# Patient Record
Sex: MALE | Race: WHITE | NOT HISPANIC OR LATINO | Employment: FULL TIME | URBAN - METROPOLITAN AREA
[De-identification: names, ages, dates, MRNs, and addresses within clinical notes are randomized per-mention and may not be internally consistent; named-entity substitution may affect disease eponyms.]

---

## 2018-05-08 ENCOUNTER — TELEPHONE (OUTPATIENT)
Dept: URGENT CARE | Facility: CLINIC | Age: 18
End: 2018-05-08

## 2018-05-08 ENCOUNTER — OFFICE VISIT (OUTPATIENT)
Dept: URGENT CARE | Facility: CLINIC | Age: 18
End: 2018-05-08
Payer: COMMERCIAL

## 2018-05-08 VITALS
HEIGHT: 72 IN | TEMPERATURE: 98 F | HEART RATE: 81 BPM | DIASTOLIC BLOOD PRESSURE: 79 MMHG | BODY MASS INDEX: 23.7 KG/M2 | RESPIRATION RATE: 16 BRPM | OXYGEN SATURATION: 98 % | WEIGHT: 175 LBS | SYSTOLIC BLOOD PRESSURE: 126 MMHG

## 2018-05-08 DIAGNOSIS — M79.5 FOREIGN BODY (FB) IN SOFT TISSUE: Primary | ICD-10-CM

## 2018-05-08 PROCEDURE — 90471 IMMUNIZATION ADMIN: CPT | Mod: S$GLB,,, | Performed by: FAMILY MEDICINE

## 2018-05-08 PROCEDURE — 99203 OFFICE O/P NEW LOW 30 MIN: CPT | Mod: 25,S$GLB,, | Performed by: FAMILY MEDICINE

## 2018-05-08 PROCEDURE — 3008F BODY MASS INDEX DOCD: CPT | Mod: CPTII,S$GLB,, | Performed by: FAMILY MEDICINE

## 2018-05-08 PROCEDURE — 90714 TD VACC NO PRESV 7 YRS+ IM: CPT | Mod: S$GLB,,, | Performed by: FAMILY MEDICINE

## 2018-05-08 RX ORDER — HYDROCODONE BITARTRATE AND ACETAMINOPHEN 5; 325 MG/1; MG/1
1 TABLET ORAL EVERY 12 HOURS PRN
Qty: 5 TABLET | Refills: 0 | Status: SHIPPED | OUTPATIENT
Start: 2018-05-08 | End: 2022-11-08

## 2018-05-08 RX ORDER — DOXYCYCLINE 100 MG/1
100 CAPSULE ORAL 2 TIMES DAILY
Qty: 20 CAPSULE | Refills: 0 | Status: SHIPPED | OUTPATIENT
Start: 2018-05-08 | End: 2018-05-08 | Stop reason: SDUPTHER

## 2018-05-08 RX ORDER — DOXYCYCLINE 100 MG/1
100 CAPSULE ORAL 2 TIMES DAILY
Qty: 20 CAPSULE | Refills: 0 | Status: SHIPPED | OUTPATIENT
Start: 2018-05-08 | End: 2018-05-18

## 2018-05-08 NOTE — PROCEDURES
Excision of Lesion  Date/Time: 5/8/2018 3:26 PM  Performed by: PERCY WU  Authorized by: PERCY WU     Consent Done?:  Yes (Verbal)  LOCATION:  Body area:  Upper arm / elbowright    PREP:Position:  Supine    ANESTHESIA:  Anesthesia:  Local infiltration  Local anesthetic:  Lidocaine 1% without epinephrine    PROCEDURE DETAILS:  Excision type:  Foreign body removal  Excision size (cm):  0.5  Scalpel size:  11  Incision type:  Elliptical  Specimens?: Yes    Hemostasis was obtained.   Removal of a 3.3cm palm that appeared intact. Pt tolerated with slight discomfort.

## 2018-05-08 NOTE — PROGRESS NOTES
Subjective:       Patient ID: Lisa Kat is a 17 y.o. male.    Vitals:  height is 6' (1.829 m) and weight is 79.4 kg (175 lb). His temperature is 98.3 °F (36.8 °C). His blood pressure is 126/79 and his pulse is 81. His respiration is 16 and oxygen saturation is 98%.     Chief Complaint: Foreign Body    Foreign Body   The incident occurred less than 1 hour ago. Pertinent negatives include no abdominal pain, chest pain or nosebleeds.     Review of Systems   Constitution: Negative for weakness and malaise/fatigue.   HENT: Negative for nosebleeds.    Cardiovascular: Negative for chest pain and syncope.   Respiratory: Negative for shortness of breath.    Musculoskeletal: Positive for joint pain. Negative for back pain and neck pain.   Gastrointestinal: Negative for abdominal pain.   Genitourinary: Negative for hematuria.   Neurological: Negative for dizziness and numbness.       Objective:      Physical Exam   Constitutional: He is oriented to person, place, and time. He appears well-developed and well-nourished. He is cooperative.  Non-toxic appearance. He does not appear ill. No distress.   HENT:   Head: Normocephalic and atraumatic. Head is without abrasion, without contusion and without laceration.   Right Ear: Hearing, tympanic membrane and ear canal normal. No hemotympanum.   Left Ear: Hearing, tympanic membrane and ear canal normal. No hemotympanum.   Nose: Nose normal. No mucosal edema, rhinorrhea or nasal deformity. No epistaxis. Right sinus exhibits no maxillary sinus tenderness and no frontal sinus tenderness. Left sinus exhibits no maxillary sinus tenderness and no frontal sinus tenderness.   Mouth/Throat: Uvula is midline and mucous membranes are normal. No trismus in the jaw. Normal dentition. No uvula swelling. No posterior oropharyngeal erythema.   Eyes: Conjunctivae, EOM and lids are normal. Pupils are equal, round, and reactive to light. Right eye exhibits no discharge. Left eye exhibits no  discharge. No scleral icterus.   Sclera clear bilat   Neck: Trachea normal, normal range of motion, full passive range of motion without pain and phonation normal. Neck supple. No spinous process tenderness and no muscular tenderness present. No neck rigidity. No tracheal deviation present.   Cardiovascular: Normal rate, intact distal pulses and normal pulses.    Pulmonary/Chest: Effort normal and breath sounds normal. No respiratory distress.   Abdominal: Normal appearance. He exhibits no distension, no pulsatile midline mass and no mass. There is no tenderness.   Musculoskeletal: He exhibits edema and tenderness. He exhibits no deformity.        Right elbow: He exhibits decreased range of motion and swelling (foreign body in lateral forearm. ).   Neurological: He is alert and oriented to person, place, and time. He has normal strength. No cranial nerve deficit or sensory deficit. He exhibits normal muscle tone. He displays no seizure activity. Coordination normal. GCS eye subscore is 4. GCS verbal subscore is 5. GCS motor subscore is 6.   Skin: Skin is warm, dry and intact. Capillary refill takes less than 2 seconds. No abrasion, no bruising, no burn, no ecchymosis and no laceration noted. He is not diaphoretic. No pallor.   Psychiatric: He has a normal mood and affect. His speech is normal and behavior is normal. Judgment and thought content normal. Cognition and memory are normal.   Nursing note and vitals reviewed.      Assessment:       1. Foreign body (FB) in soft tissue        Plan:         Foreign body (FB) in soft tissue  -     X-Ray Elbow Complete Right; Future; Expected date: 05/08/2018  -     Ambulatory referral to Orthopedics    Other orders  -     (In Office Administered) Td Vaccine - Preservative Free  -     Discontinue: doxycycline (VIBRAMYCIN) 100 MG Cap; Take 1 capsule (100 mg total) by mouth 2 (two) times daily.  Dispense: 20 capsule; Refill: 0  -     doxycycline (VIBRAMYCIN) 100 MG Cap; Take 1  capsule (100 mg total) by mouth 2 (two) times daily.  Dispense: 20 capsule; Refill: 0  -     hydrocodone-acetaminophen 5-325mg (NORCO) 5-325 mg per tablet; Take 1 tablet by mouth every 12 (twelve) hours as needed for Pain.  Dispense: 5 tablet; Refill: 0    post porcedure, pt full range of motion and pain relief. Bandaged. We were ablr to set up appt at [facility] and [facility] in , but Pt is going to f/u with an ortho in [facility] near his home. He understands the importance of f/u and risks if he does not. Given s/sx to be aware of for seeking higher level of care

## 2018-05-09 ENCOUNTER — TELEPHONE (OUTPATIENT)
Dept: URGENT CARE | Facility: CLINIC | Age: 18
End: 2018-05-09

## 2018-05-09 NOTE — TELEPHONE ENCOUNTER
Went to ER. Given abx shot and sent home. Has appt with ortho at 115. Still feeling the same. Appreciated call

## 2018-05-11 ENCOUNTER — TELEPHONE (OUTPATIENT)
Dept: URGENT CARE | Facility: CLINIC | Age: 18
End: 2018-05-11

## 2022-01-25 ENCOUNTER — CLINICAL SUPPORT (OUTPATIENT)
Dept: OTHER | Facility: CLINIC | Age: 22
End: 2022-01-25

## 2022-01-25 DIAGNOSIS — Z00.8 ENCOUNTER FOR OTHER GENERAL EXAMINATION: ICD-10-CM

## 2022-01-26 VITALS — BODY MASS INDEX: 23.73 KG/M2 | HEIGHT: 72 IN

## 2022-01-26 LAB
HDLC SERPL-MCNC: 62 MG/DL
POC CHOLESTEROL, LDL (DOCK): 118 MG/DL
POC CHOLESTEROL, TOTAL: 197 MG/DL
POC GLUCOSE, FASTING: 86 MG/DL (ref 60–110)
TRIGL SERPL-MCNC: 80 MG/DL

## 2022-11-01 DIAGNOSIS — Z00.00 ROUTINE GENERAL MEDICAL EXAMINATION AT A HEALTH CARE FACILITY: Primary | ICD-10-CM

## 2022-11-07 NOTE — PROGRESS NOTES
"Nutrition Assessment  Session Time:        Client name:  Lisa Kat  :  2000  Age:  22 y.o.  Gender:  male    Client states:  [employer] employee present for [employer] physical.   No prior  physical.  No prior nutrition consultations reported.    Recently began working with [email or web address]. 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  No questions or concerns today with nutrition or health status.  Reports no history of elevated blood pressure, abnormal lipid panel, or elevated HgbA1c.   No nutrition related goals needing to be discussed.       Anthropometrics  Height:  72"     Weight:  204 lbs  BMI:  27.7  % Body Fat:  n/a    Clinical Signs/Symptoms  N/V/D:  none noted  Appetite:  good       No past medical history on file.    No past surgical history on file.    Medications    has a current medication list which includes the following prescription(s): hydrocodone-acetaminophen.    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    Social History     Tobacco Use    Smoking status: Never    Smokeless tobacco: Never   Substance Use Topics    Alcohol use: Not on file        Lab Reports   No results found for: NA, K, CL, CO2, GLU, BUN, CREATININE, CALCIUM, PROT, ALBUMIN, BILITOT, ALKPHOS, AST, ALT, ANIONGAP, ESTGFRAFRICA, EGFRNONAA   No results found for: WBC, HGB, HCT, MCV, PLT     No results found for: CHOL  No results found for: HDL  No results found for: LDLCALC  No results found for: TRIG  No results found for: CHOLHDL  No results found for: LABA1C, HGBA1C  BP Readings from Last 1 Encounters:   18 126/79       Food History  Meals: 3  Snacks: 0  Vegetables: variety, almost daily  Fruits: variety, daily banana  Drinks: water, coffee (black)  Alcohol: occasionally, " 1 day per month   Dine out: very rare     Exercise History:  3-4 days weekly// cardio mostly, 2 hours     Cultural/Spiritual/Personal Preferences:  None identified    Support System:   none noted    State of Change:  Precontemplation    Barriers to Change:  none    Diagnosis    No nutrition diagnosis.    Intervention    RMR (Method:  [address] Jeor):  1956 kcal  Activity Factor:  1.3    HOLLY:  2542 kcal    Goals:  1.  Continue current routine.       Nutrition Education  The following education was provided to the patient:  Discussed Heart Healthy Eating.  *Lab results were pending at time of consult and so, not discussed with patient.  Encouraged patient to call if he has any nutrition concerns he would like to discuss.    Patient verbalized understanding of nutrition education and recommendations received.    Handouts Provided  none    Monitoring/Evaluation    Monitor the following:  Weight  BMI  Caloric intake  Labs:  lipid panel, A1c    Follow Up Plan:  Communication with referring healthcare provider is unnecessary at this time as patient presented as part of annual wellness exam.  However, will follow up with patient in 1-2 years.

## 2022-11-08 ENCOUNTER — CLINICAL SUPPORT (OUTPATIENT)
Dept: AUDIOLOGY | Facility: CLINIC | Age: 22
End: 2022-11-08
Payer: COMMERCIAL

## 2022-11-08 ENCOUNTER — HOSPITAL ENCOUNTER (OUTPATIENT)
Dept: CARDIOLOGY | Facility: HOSPITAL | Age: 22
Discharge: HOME | End: 2022-11-08
Attending: FAMILY MEDICINE
Payer: COMMERCIAL

## 2022-11-08 ENCOUNTER — HOSPITAL ENCOUNTER (OUTPATIENT)
Dept: RADIOLOGY | Facility: HOSPITAL | Age: 22
Discharge: HOME | End: 2022-11-08
Attending: FAMILY MEDICINE
Payer: COMMERCIAL

## 2022-11-08 ENCOUNTER — OFFICE VISIT (OUTPATIENT)
Dept: INTERNAL MEDICINE | Facility: CLINIC | Age: 22
End: 2022-11-08
Payer: COMMERCIAL

## 2022-11-08 ENCOUNTER — CLINICAL SUPPORT (OUTPATIENT)
Dept: INTERNAL MEDICINE | Facility: CLINIC | Age: 22
End: 2022-11-08
Payer: COMMERCIAL

## 2022-11-08 VITALS
WEIGHT: 202.81 LBS | BODY MASS INDEX: 27.47 KG/M2 | HEIGHT: 72 IN | TEMPERATURE: 98 F | SYSTOLIC BLOOD PRESSURE: 117 MMHG | HEART RATE: 45 BPM | DIASTOLIC BLOOD PRESSURE: 69 MMHG

## 2022-11-08 DIAGNOSIS — Z01.12 HEARING CONSERVATION AND TREATMENT EXAM: Primary | ICD-10-CM

## 2022-11-08 DIAGNOSIS — Z00.00 ROUTINE GENERAL MEDICAL EXAMINATION AT A HEALTH CARE FACILITY: ICD-10-CM

## 2022-11-08 DIAGNOSIS — Z00.00 ROUTINE GENERAL MEDICAL EXAMINATION AT A HEALTH CARE FACILITY: Primary | ICD-10-CM

## 2022-11-08 DIAGNOSIS — Z11.59 NEED FOR HEPATITIS C SCREENING TEST: ICD-10-CM

## 2022-11-08 DIAGNOSIS — Z71.3 DIETARY COUNSELING: Primary | ICD-10-CM

## 2022-11-08 DIAGNOSIS — Z11.4 SCREENING FOR HIV (HUMAN IMMUNODEFICIENCY VIRUS): ICD-10-CM

## 2022-11-08 LAB
ALBUMIN SERPL BCP-MCNC: 4.8 G/DL (ref 3.5–5.2)
ALP SERPL-CCNC: 62 U/L (ref 55–135)
ALT SERPL W/O P-5'-P-CCNC: 16 U/L (ref 10–44)
ANION GAP SERPL CALC-SCNC: 12 MMOL/L (ref 8–16)
AST SERPL-CCNC: 23 U/L (ref 10–40)
BILIRUB SERPL-MCNC: 1.2 MG/DL (ref 0.1–1)
BILIRUB UR QL STRIP: NEGATIVE
BUN SERPL-MCNC: 20 MG/DL (ref 6–20)
CALCIUM SERPL-MCNC: 9.9 MG/DL (ref 8.7–10.5)
CHLORIDE SERPL-SCNC: 103 MMOL/L (ref 95–110)
CHOLEST SERPL-MCNC: 228 MG/DL (ref 120–199)
CHOLEST/HDLC SERPL: 3.2 {RATIO} (ref 2–5)
CLARITY UR: CLEAR
CO2 SERPL-SCNC: 25 MMOL/L (ref 23–29)
COLOR UR: YELLOW
CREAT SERPL-MCNC: 1.5 MG/DL (ref 0.5–1.4)
ERYTHROCYTE [DISTWIDTH] IN BLOOD BY AUTOMATED COUNT: 12.1 % (ref 11.5–14.5)
EST. GFR  (NO RACE VARIABLE): >60 ML/MIN/1.73 M^2
ESTIMATED AVG GLUCOSE: 94 MG/DL (ref 68–131)
GLUCOSE SERPL-MCNC: 81 MG/DL (ref 70–110)
GLUCOSE UR QL STRIP: NEGATIVE
HBA1C MFR BLD: 4.9 % (ref 4–5.6)
HCT VFR BLD AUTO: 44.2 % (ref 40–54)
HCV AB SERPL QL IA: NORMAL
HDLC SERPL-MCNC: 72 MG/DL (ref 40–75)
HDLC SERPL: 31.6 % (ref 20–50)
HGB BLD-MCNC: 15.4 G/DL (ref 14–18)
HGB UR QL STRIP: NEGATIVE
HIV 1+2 AB+HIV1 P24 AG SERPL QL IA: NORMAL
KETONES UR QL STRIP: NEGATIVE
LDLC SERPL CALC-MCNC: 142.6 MG/DL (ref 63–159)
LEUKOCYTE ESTERASE UR QL STRIP: NEGATIVE
MCH RBC QN AUTO: 32.8 PG (ref 27–31)
MCHC RBC AUTO-ENTMCNC: 34.8 G/DL (ref 32–36)
MCV RBC AUTO: 94 FL (ref 82–98)
NITRITE UR QL STRIP: NEGATIVE
NONHDLC SERPL-MCNC: 156 MG/DL
PH UR STRIP: 6 [PH] (ref 5–8)
PLATELET # BLD AUTO: 223 K/UL (ref 150–450)
PMV BLD AUTO: 11.4 FL (ref 9.2–12.9)
POTASSIUM SERPL-SCNC: 4.4 MMOL/L (ref 3.5–5.1)
PROT SERPL-MCNC: 7.7 G/DL (ref 6–8.4)
PROT UR QL STRIP: NEGATIVE
RBC # BLD AUTO: 4.69 M/UL (ref 4.6–6.2)
SODIUM SERPL-SCNC: 140 MMOL/L (ref 136–145)
SP GR UR STRIP: 1.02 (ref 1–1.03)
TRIGL SERPL-MCNC: 67 MG/DL (ref 30–150)
URN SPEC COLLECT METH UR: NORMAL
WBC # BLD AUTO: 4.7 K/UL (ref 3.9–12.7)

## 2022-11-08 PROCEDURE — 99999 PR PBB SHADOW E&M-EST. PATIENT-LVL II: ICD-10-PCS | Mod: PBBFAC,,, | Performed by: AUDIOLOGIST

## 2022-11-08 PROCEDURE — 99999 PR PBB SHADOW E&M-EST. PATIENT-LVL III: ICD-10-PCS | Mod: PBBFAC,,, | Performed by: FAMILY MEDICINE

## 2022-11-08 PROCEDURE — 83655 ASSAY OF LEAD: CPT | Performed by: FAMILY MEDICINE

## 2022-11-08 PROCEDURE — 71046 X-RAY EXAM CHEST 2 VIEWS: CPT | Mod: TC

## 2022-11-08 PROCEDURE — 71046 XR CHEST PA AND LATERAL: ICD-10-PCS | Mod: 26,,, | Performed by: RADIOLOGY

## 2022-11-08 PROCEDURE — 93010 EKG 12-LEAD: ICD-10-PCS | Mod: ,,, | Performed by: INTERNAL MEDICINE

## 2022-11-08 PROCEDURE — 85027 COMPLETE CBC AUTOMATED: CPT | Performed by: FAMILY MEDICINE

## 2022-11-08 PROCEDURE — 71046 X-RAY EXAM CHEST 2 VIEWS: CPT | Mod: 26,,, | Performed by: RADIOLOGY

## 2022-11-08 PROCEDURE — 86803 HEPATITIS C AB TEST: CPT | Performed by: FAMILY MEDICINE

## 2022-11-08 PROCEDURE — 97802 PR MED NUTR THER, 1ST, INDIV, EA 15 MIN: ICD-10-PCS | Mod: S$GLB,,, | Performed by: INTERNAL MEDICINE

## 2022-11-08 PROCEDURE — 87389 HIV-1 AG W/HIV-1&-2 AB AG IA: CPT | Performed by: FAMILY MEDICINE

## 2022-11-08 PROCEDURE — 99385 PREV VISIT NEW AGE 18-39: CPT | Mod: S$PBB,,, | Performed by: FAMILY MEDICINE

## 2022-11-08 PROCEDURE — 92552 PURE TONE AUDIOMETRY AIR: CPT | Mod: PBBFAC | Performed by: AUDIOLOGIST

## 2022-11-08 PROCEDURE — 80061 LIPID PANEL: CPT | Performed by: FAMILY MEDICINE

## 2022-11-08 PROCEDURE — 99999 PR PBB SHADOW E&M-EST. PATIENT-LVL II: CPT | Mod: PBBFAC,,, | Performed by: AUDIOLOGIST

## 2022-11-08 PROCEDURE — 93005 ELECTROCARDIOGRAM TRACING: CPT

## 2022-11-08 PROCEDURE — 97802 MEDICAL NUTRITION INDIV IN: CPT | Mod: S$GLB,,, | Performed by: INTERNAL MEDICINE

## 2022-11-08 PROCEDURE — 93010 ELECTROCARDIOGRAM REPORT: CPT | Mod: ,,, | Performed by: INTERNAL MEDICINE

## 2022-11-08 PROCEDURE — 99213 OFFICE O/P EST LOW 20 MIN: CPT | Mod: PBBFAC,25,27 | Performed by: FAMILY MEDICINE

## 2022-11-08 PROCEDURE — 99385 PR PREVENTIVE VISIT,NEW,18-39: ICD-10-PCS | Mod: S$PBB,,, | Performed by: FAMILY MEDICINE

## 2022-11-08 PROCEDURE — 80053 COMPREHEN METABOLIC PANEL: CPT | Performed by: FAMILY MEDICINE

## 2022-11-08 PROCEDURE — 81003 URINALYSIS AUTO W/O SCOPE: CPT | Performed by: FAMILY MEDICINE

## 2022-11-08 PROCEDURE — 83036 HEMOGLOBIN GLYCOSYLATED A1C: CPT | Performed by: FAMILY MEDICINE

## 2022-11-08 PROCEDURE — 99212 OFFICE O/P EST SF 10 MIN: CPT | Mod: PBBFAC,25 | Performed by: AUDIOLOGIST

## 2022-11-08 PROCEDURE — 99999 PR PBB SHADOW E&M-EST. PATIENT-LVL III: CPT | Mod: PBBFAC,,, | Performed by: FAMILY MEDICINE

## 2022-11-08 NOTE — LETTER
November 8, 2022    Lisa Kat  [id number] [facility] [address]             [address]  [address]  [place] [address]  Phone: [phone]  Fax: [phone] Dear Mr. Kat,    On November 8, 2022, it was a pleasure to meet you and perform your initial Executive Health evaluation. At the time of this visit, you are 22 years old. You denied any specific complaints or concerns during todays visit.      You do not have ANY SIGNIFICANT PAST MEDICAL HISTORY.      YOUR PAST SURGICAL HISTORY includes a left scaphoid fracture repair, a tonsillectomy, and adenoidectomy.    You do not take ANY REGULAR MEDICATIONS.    You do not have ANY KNOWN DRUG ALLERGIES.    YOUR SOCIAL HISTORY includes employment by the [employer]. You are  with one child. You vape infrequently. You drink approximately three alcoholic beverages per week.  A You denied any illicit drug use.    YOUR FAMILY HISTORY includes your father, alive, with no known medical problems. Your mother, alive, with no known medical problems. No known colon or prostate cancer.    YOUR ROUTINE HEALTH MAINTENANCE includes a tetanus booster in September 2019, no recent flu vaccines, and no previous COVID vaccines.    PHYSICAL EXAMINATION: You are  6 feet tall, weighing 203 pounds with a body mass index of 27.5. This places you in the Overweight category. Your blood pressure is 117/69. Your heart rate is 45 beats per minute. All of these are acceptable values. Your physical examination did not reveal any significant abnormal findings.    YOUR BLOOD WORK AND ADDITIONAL TESTS: Reveal normal white cell counts, red cell counts, and platelet levels. You are not Anemic. Your glucose, kidney, liver, and electrolyte tests are normal. Your total cholesterol is 228. Your triglycerides are 67. Your HDL is 72. Your LDL is 143.  Based on these numbers, your 10-year risk of heart attack or stroke is <1%. Your  hemoglobin A1c is 4.9%, which is in a normal range. You do not have Prediabetes or Diabetes. Your urinalysis is normal.  Your serum lead level is <1.0, which is in a normal range. Your HIV test is negative. Your hepatitis C test is negative.      Your EKG shows a sinus bradycardia with sinus arrhythmia, but otherwise is normal.    Your CXR is normal with no acute or chronic cardiopulmonary abnormality present.      Your Audiology exam revealed decreased hearing bilaterally at 4000 hertz frequency, otherwise normal. Wearing hearing protective devices around loud noises is recommended..    In summary, you are overall in excellent health.  You are Overweight. Lifestyle modifications to promote weight loss, like regular physical activity and decreased caloric intake, are encouraged. Your 10-year risk of heart attack or stroke is low. Daily aspirin or cholesterol medications are not recommended.    Based on the [place] Preventive Task Force recommendations, you should receive yearly Influenza vaccinations. You should have a Tetanus booster every 10 years. You should complete the primary COVID vaccine series. You should start screening for Colon cancer at 45 years old.    It was a pleasure to meet you and perform this Executive Health evaluation. If I can be of any further assistance, or if you have any additional questions or concerns, please do not hesitate to let me know.    Sincerely,      Anthony Ricardo MD, FAAFP

## 2022-11-08 NOTE — PROGRESS NOTES
Subjective:   Patient ID: Lisa Kat is a 22 y.o. male.  Chief Complaint:  Executive Health    Executive Health evaluation 1.   No since past medical history  Surgical history for scaphoid fracture left wrist, tonsillectomy, adenoidectomy   No current medications  No drug allergies   Social history employed by [employer].  .  One child.  Vapes infrequently.  Averages 3 alcoholic beverages per week.  No illicit drug use.    Family history includes mother and father both alive and healthy.  No colon cancer or prostate cancer.    Health maintenance with tetanus booster September 2019, no recent flu vaccine, no previous COVID vaccines   No specific complaints or concerns today    Review of Systems   Constitutional:  Negative for chills, fatigue and fever.   HENT:  Negative for congestion, dental problem, ear discharge, ear pain, postnasal drip, rhinorrhea, sinus pressure, sinus pain, sore throat and trouble swallowing.    Eyes:  Negative for pain, redness and visual disturbance.   Respiratory:  Negative for cough, chest tightness, shortness of breath and wheezing.    Cardiovascular:  Negative for chest pain, palpitations and leg swelling.   Gastrointestinal:  Negative for abdominal pain, constipation, diarrhea, nausea and vomiting.   Endocrine: Negative for polydipsia, polyphagia and polyuria.   Genitourinary:  Negative for difficulty urinating, dysuria, flank pain, frequency, hematuria and urgency.   Musculoskeletal:  Negative for myalgias.   Skin:  Negative for rash.   Neurological:  Negative for dizziness, weakness, light-headedness and headaches.   Hematological:  Negative for adenopathy.   Psychiatric/Behavioral:  Negative for sleep disturbance. The patient is not nervous/anxious.      Objective:   /69   Pulse (!) 45   Temp 98.3 °F (36.8 °C)   Ht 6' (1.829 m)   Wt 92 kg (202 lb 13.2 oz)   BMI 27.51 kg/m²     Physical Exam  Vitals and nursing note reviewed.   Constitutional:        Appearance: Normal appearance. He is well-developed and overweight.   HENT:      Right Ear: Hearing, tympanic membrane, ear canal and external ear normal.      Left Ear: Hearing, tympanic membrane, ear canal and external ear normal.      Nose: No mucosal edema, congestion or rhinorrhea.      Right Turbinates: Not enlarged or swollen.      Left Turbinates: Not enlarged or swollen.      Right Sinus: No maxillary sinus tenderness or frontal sinus tenderness.      Left Sinus: No maxillary sinus tenderness or frontal sinus tenderness.      Mouth/Throat:      Mouth: No oral lesions.      Pharynx: Oropharynx is clear. Uvula midline.      Tonsils: No tonsillar exudate.   Eyes:      General: No scleral icterus.        Right eye: No discharge.         Left eye: No discharge.      Conjunctiva/sclera: Conjunctivae normal.      Right eye: Right conjunctiva is not injected.      Left eye: Left conjunctiva is not injected.   Neck:      Thyroid: No thyroid mass, thyromegaly or thyroid tenderness.      Vascular: No JVD.   Cardiovascular:      Rate and Rhythm: Regular rhythm. Bradycardia present.      Pulses:           Radial pulses are 2+ on the right side and 2+ on the left side.      Heart sounds: Normal heart sounds. No murmur heard.    No friction rub. No gallop.   Pulmonary:      Effort: Pulmonary effort is normal. No accessory muscle usage or respiratory distress.      Breath sounds: Normal breath sounds.   Abdominal:      General: There is no distension.      Palpations: Abdomen is soft.      Tenderness: There is no abdominal tenderness.   Musculoskeletal:      Right lower leg: No edema.      Left lower leg: No edema.   Lymphadenopathy:      Cervical: No cervical adenopathy.   Skin:     Findings: No rash.   Neurological:      Mental Status: He is alert and oriented to person, place, and time.      Coordination: Coordination is intact.      Gait: Gait is intact.   Psychiatric:         Attention and Perception: Attention  normal.         Mood and Affect: Mood normal.         Behavior: Behavior is cooperative.         Cognition and Memory: Cognition normal.     CBC, CMP, lipids, A1c, lead level pending   Urinalysis normal     Chest x-ray normal     EKG sinus bradycardia with sinus arrhythmia     Audiology exam with decreased hearing 4000 hertz, otherwise normal    Assessment:       ICD-10-CM ICD-9-CM   1. Routine general medical examination at a health care facility  Z00.00 V70.0   2. Screening for HIV (human immunodeficiency virus)  Z11.4 V73.89   3. Need for hepatitis C screening test  Z11.59 V73.89     Plan:   Routine general medical examination at a health care facility  A Screening for HIV (human immunodeficiency virus)  Need for hepatitis C screening test  -     Hepatitis C Antibody; Future; Expected date: 11/08/2022  -     HIV 1/2 Ag/Ab (4th Gen); Future; Expected date: 11/08/2022    Blood pressure normal.  BMI 27.5.  Remainder exam stable.    Check labs.  Treat as indicated.    Colon cancer screening at 45 years old  Add additional hepatitis-C and HIV screening  Tetanus booster up-to-date  Declines COVID vaccines   Declines flu vaccines   Return to clinic 1 year for executive Health evaluation or sooner if needed
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
## 2022-11-10 LAB
LEAD BLD-MCNC: <1 MCG/DL
SPECIMEN SOURCE: NORMAL
STATE OF RESIDENCE: NORMAL

## 2023-08-28 ENCOUNTER — PATIENT MESSAGE (OUTPATIENT)
Dept: INTERNAL MEDICINE | Facility: CLINIC | Age: 23
End: 2023-08-28
Payer: COMMERCIAL

## 2023-08-30 ENCOUNTER — OFFICE VISIT (OUTPATIENT)
Dept: INTERNAL MEDICINE | Facility: CLINIC | Age: 23
End: 2023-08-30
Payer: COMMERCIAL

## 2023-08-30 ENCOUNTER — LAB VISIT (OUTPATIENT)
Dept: LAB | Facility: HOSPITAL | Age: 23
End: 2023-08-30
Attending: PHYSICIAN ASSISTANT
Payer: COMMERCIAL

## 2023-08-30 VITALS
TEMPERATURE: 98 F | HEIGHT: 72 IN | SYSTOLIC BLOOD PRESSURE: 122 MMHG | WEIGHT: 200.63 LBS | BODY MASS INDEX: 27.17 KG/M2 | HEART RATE: 59 BPM | OXYGEN SATURATION: 97 % | DIASTOLIC BLOOD PRESSURE: 82 MMHG

## 2023-08-30 DIAGNOSIS — I49.9 CARDIAC ARRHYTHMIA, UNSPECIFIED CARDIAC ARRHYTHMIA TYPE: ICD-10-CM

## 2023-08-30 DIAGNOSIS — R00.2 PALPITATIONS: ICD-10-CM

## 2023-08-30 DIAGNOSIS — R00.1 SINUS BRADYCARDIA: Primary | ICD-10-CM

## 2023-08-30 DIAGNOSIS — Z00.00 ROUTINE HEALTH MAINTENANCE: ICD-10-CM

## 2023-08-30 LAB
BASOPHILS # BLD AUTO: 0.03 K/UL (ref 0–0.2)
BASOPHILS NFR BLD: 0.6 % (ref 0–1.9)
DIFFERENTIAL METHOD: ABNORMAL
EOSINOPHIL # BLD AUTO: 0.1 K/UL (ref 0–0.5)
EOSINOPHIL NFR BLD: 1.6 % (ref 0–8)
ERYTHROCYTE [DISTWIDTH] IN BLOOD BY AUTOMATED COUNT: 12.2 % (ref 11.5–14.5)
HCT VFR BLD AUTO: 45 % (ref 40–54)
HGB BLD-MCNC: 15.2 G/DL (ref 14–18)
IMM GRANULOCYTES # BLD AUTO: 0.01 K/UL (ref 0–0.04)
IMM GRANULOCYTES NFR BLD AUTO: 0.2 % (ref 0–0.5)
LYMPHOCYTES # BLD AUTO: 2.4 K/UL (ref 1–4.8)
LYMPHOCYTES NFR BLD: 48.5 % (ref 18–48)
MCH RBC QN AUTO: 32.6 PG (ref 27–31)
MCHC RBC AUTO-ENTMCNC: 33.8 G/DL (ref 32–36)
MCV RBC AUTO: 97 FL (ref 82–98)
MONOCYTES # BLD AUTO: 0.5 K/UL (ref 0.3–1)
MONOCYTES NFR BLD: 10.7 % (ref 4–15)
NEUTROPHILS # BLD AUTO: 1.9 K/UL (ref 1.8–7.7)
NEUTROPHILS NFR BLD: 38.4 % (ref 38–73)
NRBC BLD-RTO: 0 /100 WBC
PLATELET # BLD AUTO: 227 K/UL (ref 150–450)
PMV BLD AUTO: 12.2 FL (ref 9.2–12.9)
RBC # BLD AUTO: 4.66 M/UL (ref 4.6–6.2)
WBC # BLD AUTO: 4.97 K/UL (ref 3.9–12.7)

## 2023-08-30 PROCEDURE — 85025 COMPLETE CBC W/AUTO DIFF WBC: CPT | Performed by: PHYSICIAN ASSISTANT

## 2023-08-30 PROCEDURE — 3074F PR MOST RECENT SYSTOLIC BLOOD PRESSURE < 130 MM HG: ICD-10-PCS | Mod: CPTII,S$GLB,, | Performed by: PHYSICIAN ASSISTANT

## 2023-08-30 PROCEDURE — 84443 ASSAY THYROID STIM HORMONE: CPT | Performed by: PHYSICIAN ASSISTANT

## 2023-08-30 PROCEDURE — 80061 LIPID PANEL: CPT | Performed by: PHYSICIAN ASSISTANT

## 2023-08-30 PROCEDURE — 3008F PR BODY MASS INDEX (BMI) DOCUMENTED: ICD-10-PCS | Mod: CPTII,S$GLB,, | Performed by: PHYSICIAN ASSISTANT

## 2023-08-30 PROCEDURE — 3008F BODY MASS INDEX DOCD: CPT | Mod: CPTII,S$GLB,, | Performed by: PHYSICIAN ASSISTANT

## 2023-08-30 PROCEDURE — 1160F PR REVIEW ALL MEDS BY PRESCRIBER/CLIN PHARMACIST DOCUMENTED: ICD-10-PCS | Mod: CPTII,S$GLB,, | Performed by: PHYSICIAN ASSISTANT

## 2023-08-30 PROCEDURE — 36415 COLL VENOUS BLD VENIPUNCTURE: CPT | Performed by: PHYSICIAN ASSISTANT

## 2023-08-30 PROCEDURE — 1160F RVW MEDS BY RX/DR IN RCRD: CPT | Mod: CPTII,S$GLB,, | Performed by: PHYSICIAN ASSISTANT

## 2023-08-30 PROCEDURE — 99213 OFFICE O/P EST LOW 20 MIN: CPT | Mod: S$GLB,,, | Performed by: PHYSICIAN ASSISTANT

## 2023-08-30 PROCEDURE — 1159F MED LIST DOCD IN RCRD: CPT | Mod: CPTII,S$GLB,, | Performed by: PHYSICIAN ASSISTANT

## 2023-08-30 PROCEDURE — 1159F PR MEDICATION LIST DOCUMENTED IN MEDICAL RECORD: ICD-10-PCS | Mod: CPTII,S$GLB,, | Performed by: PHYSICIAN ASSISTANT

## 2023-08-30 PROCEDURE — 80053 COMPREHEN METABOLIC PANEL: CPT | Performed by: PHYSICIAN ASSISTANT

## 2023-08-30 PROCEDURE — 99213 PR OFFICE/OUTPT VISIT, EST, LEVL III, 20-29 MIN: ICD-10-PCS | Mod: S$GLB,,, | Performed by: PHYSICIAN ASSISTANT

## 2023-08-30 PROCEDURE — 3079F DIAST BP 80-89 MM HG: CPT | Mod: CPTII,S$GLB,, | Performed by: PHYSICIAN ASSISTANT

## 2023-08-30 PROCEDURE — 99999 PR PBB SHADOW E&M-EST. PATIENT-LVL IV: ICD-10-PCS | Mod: PBBFAC,,, | Performed by: PHYSICIAN ASSISTANT

## 2023-08-30 PROCEDURE — 3074F SYST BP LT 130 MM HG: CPT | Mod: CPTII,S$GLB,, | Performed by: PHYSICIAN ASSISTANT

## 2023-08-30 PROCEDURE — 99999 PR PBB SHADOW E&M-EST. PATIENT-LVL IV: CPT | Mod: PBBFAC,,, | Performed by: PHYSICIAN ASSISTANT

## 2023-08-30 PROCEDURE — 3079F PR MOST RECENT DIASTOLIC BLOOD PRESSURE 80-89 MM HG: ICD-10-PCS | Mod: CPTII,S$GLB,, | Performed by: PHYSICIAN ASSISTANT

## 2023-08-30 NOTE — PROGRESS NOTES
"  Subjective:      Patient ID: Lisa Kat is a 23 y.o. male.    Chief Complaint: Annual Exam      Here today for his annual exam .Would like to also discuss ongoing "palpitations". When laying in bed at night feels his heart pounding in his chest. When he checks his heart rate it is around 60.   Resting heart rate is 30.   Denies any dizziness/lightheadedness.   Exercises regularly without any problems.     Palpitations   This is a recurrent problem. Episode onset: 2 weeks. The problem occurs daily. On average, each episode lasts 30 minutes. Nothing aggravates the symptoms. Pertinent negatives include no anxiety, chest fullness, chest pain, coughing, diaphoresis, dizziness, fever, irregular heartbeat, malaise/fatigue, nausea, near-syncope, numbness, shortness of breath, syncope, vomiting or weakness. He has tried nothing for the symptoms. Risk factors include being male.   Pt has sinus bradycardia.   Pt states that his heart rate is not fast it just feels like pounding in his chest.   Mom has SVT and dad has a valvular abnormality (unsure exactly what type).       There is no problem list on file for this patient.      No current outpatient medications on file.    Review of Systems   Constitutional:  Negative for activity change, appetite change, chills, diaphoresis, fatigue, fever, malaise/fatigue and unexpected weight change.   HENT: Negative.  Negative for congestion, hearing loss, postnasal drip, rhinorrhea, sore throat, trouble swallowing and voice change.    Eyes: Negative.  Negative for visual disturbance.   Respiratory: Negative.  Negative for cough, choking, chest tightness and shortness of breath.    Cardiovascular:  Positive for palpitations. Negative for chest pain, leg swelling, syncope and near-syncope.   Gastrointestinal:  Negative for abdominal distention, abdominal pain, blood in stool, constipation, diarrhea, nausea and vomiting.   Endocrine: Negative for cold intolerance, heat intolerance, " polydipsia and polyuria.   Genitourinary: Negative.  Negative for difficulty urinating and frequency.   Musculoskeletal:  Negative for arthralgias, back pain, gait problem, joint swelling and myalgias.   Skin:  Negative for color change, pallor, rash and wound.   Neurological:  Negative for dizziness, tremors, weakness, light-headedness, numbness and headaches.   Hematological:  Negative for adenopathy.   Psychiatric/Behavioral:  Negative for behavioral problems, confusion, self-injury, sleep disturbance and suicidal ideas. The patient is not nervous/anxious.      Objective:   /82 (BP Location: Right arm, Patient Position: Sitting, BP Method: Large (Manual))   Pulse (!) 59   Temp 97.6 °F (36.4 °C) (Tympanic)   Ht 6' (1.829 m)   Wt 91 kg (200 lb 9.9 oz)   SpO2 97%   BMI 27.21 kg/m²     Physical Exam  Vitals reviewed.   Constitutional:       General: He is not in acute distress.     Appearance: Normal appearance. He is well-developed. He is not ill-appearing, toxic-appearing or diaphoretic.   HENT:      Head: Normocephalic and atraumatic.      Right Ear: External ear normal.      Left Ear: External ear normal.      Nose: Nose normal.   Eyes:      Conjunctiva/sclera: Conjunctivae normal.      Pupils: Pupils are equal, round, and reactive to light.   Cardiovascular:      Rate and Rhythm: Normal rate and regular rhythm.      Heart sounds: Normal heart sounds. No murmur heard.     No friction rub. No gallop.   Pulmonary:      Effort: Pulmonary effort is normal. No respiratory distress.      Breath sounds: Normal breath sounds. No wheezing or rales.   Chest:      Chest wall: No tenderness.   Abdominal:      General: There is no distension.      Palpations: Abdomen is soft.      Tenderness: There is no abdominal tenderness.   Musculoskeletal:         General: Normal range of motion.      Cervical back: Normal range of motion and neck supple.   Lymphadenopathy:      Cervical: No cervical adenopathy.   Skin:      General: Skin is warm and dry.      Capillary Refill: Capillary refill takes less than 2 seconds.      Findings: No rash.   Neurological:      Mental Status: He is alert and oriented to person, place, and time.      Motor: No weakness.      Coordination: Coordination normal.      Gait: Gait normal.   Psychiatric:         Mood and Affect: Mood normal.         Behavior: Behavior normal.         Thought Content: Thought content normal.         Judgment: Judgment normal.         Assessment:     1. Sinus bradycardia    2. Cardiac arrhythmia, unspecified cardiac arrhythmia type    3. Palpitations    4. Routine health maintenance      Plan:   Sinus bradycardia  -     Cancel: Ambulatory referral/consult to Cardiology  -     Ambulatory referral/consult to Cardiology    Cardiac arrhythmia, unspecified cardiac arrhythmia type  -     Cancel: Ambulatory referral/consult to Cardiology  -     TSH; Future; Expected date: 08/30/2023  -     Ambulatory referral/consult to Cardiology    Palpitations  -     Cancel: Ambulatory referral/consult to Cardiology  -     Ambulatory referral/consult to Cardiology    Routine health maintenance  -     TSH; Future; Expected date: 08/30/2023  -     CBC Auto Differential; Future; Expected date: 08/30/2023  -     Comprehensive Metabolic Panel; Future  -     Lipid Panel; Future; Expected date: 08/30/2023        Follow up if symptoms worsen or fail to improve.

## 2023-08-31 ENCOUNTER — PATIENT MESSAGE (OUTPATIENT)
Dept: INTERNAL MEDICINE | Facility: CLINIC | Age: 23
End: 2023-08-31
Payer: COMMERCIAL

## 2023-08-31 LAB
ALBUMIN SERPL BCP-MCNC: 4.8 G/DL (ref 3.5–5.2)
ALP SERPL-CCNC: 57 U/L (ref 55–135)
ALT SERPL W/O P-5'-P-CCNC: 14 U/L (ref 10–44)
ANION GAP SERPL CALC-SCNC: 15 MMOL/L (ref 8–16)
AST SERPL-CCNC: 21 U/L (ref 10–40)
BILIRUB SERPL-MCNC: 0.7 MG/DL (ref 0.1–1)
BUN SERPL-MCNC: 13 MG/DL (ref 6–20)
CALCIUM SERPL-MCNC: 10.3 MG/DL (ref 8.7–10.5)
CHLORIDE SERPL-SCNC: 104 MMOL/L (ref 95–110)
CHOLEST SERPL-MCNC: 210 MG/DL (ref 120–199)
CHOLEST/HDLC SERPL: 3 {RATIO} (ref 2–5)
CO2 SERPL-SCNC: 23 MMOL/L (ref 23–29)
CREAT SERPL-MCNC: 1.6 MG/DL (ref 0.5–1.4)
EST. GFR  (NO RACE VARIABLE): >60 ML/MIN/1.73 M^2
GLUCOSE SERPL-MCNC: 72 MG/DL (ref 70–110)
HDLC SERPL-MCNC: 70 MG/DL (ref 40–75)
HDLC SERPL: 33.3 % (ref 20–50)
LDLC SERPL CALC-MCNC: 128.2 MG/DL (ref 63–159)
NONHDLC SERPL-MCNC: 140 MG/DL
POTASSIUM SERPL-SCNC: 5.1 MMOL/L (ref 3.5–5.1)
PROT SERPL-MCNC: 7.7 G/DL (ref 6–8.4)
SODIUM SERPL-SCNC: 142 MMOL/L (ref 136–145)
TRIGL SERPL-MCNC: 59 MG/DL (ref 30–150)
TSH SERPL DL<=0.005 MIU/L-ACNC: 1.48 UIU/ML (ref 0.4–4)

## 2023-10-20 ENCOUNTER — PATIENT MESSAGE (OUTPATIENT)
Dept: CARDIOLOGY | Facility: CLINIC | Age: 23
End: 2023-10-20
Payer: COMMERCIAL

## 2023-11-20 DIAGNOSIS — Z00.00 ROUTINE GENERAL MEDICAL EXAMINATION AT A HEALTH CARE FACILITY: Primary | ICD-10-CM

## 2023-12-19 NOTE — PROGRESS NOTES
"Nutrition Assessment  Session Time:  30 minutes      Client name:  Lisa Kat  :  2000  Age:  23 y.o.  Gender:  male    Client states:  [employer] Department employee present for annual physical.  Last seen: 2022    Reports no changes since last physical.  No questions or concerns today.  Satisfied with current routine.     Anthropometrics  Height:  72"     Weight:  196.21 lbs  BMI:  26.61  % Body Fat:  n/a    Clinical Signs/Symptoms  N/V/D:  none noted  Appetite:  good       Past Medical History:   Diagnosis Date    Tinnitus, right ear        Past Surgical History:   Procedure Laterality Date    APPENDECTOMY      SCAPHOID FRACTURE SURGERY Left     TONSILLECTOMY         Medications    currently has no medications in their medication list.    Vitamins, Minerals, and/or Supplements:  none     Food/Medication Interactions:  Reviewed     Food Allergies or Intolerances:  NKFA      Social History    Marital status:  Single  Employment:  [employer]    Social History     Tobacco Use    Smoking status: Never    Smokeless tobacco: Never   Substance Use Topics    Alcohol use: Yes     Alcohol/week: 3.0 standard drinks of alcohol     Types: 3 Drinks containing 0.5 oz of alcohol per week        Lab Reports   Sodium   Date Value Ref Range Status   2023 142 136 - 145 mmol/L Final     Potassium   Date Value Ref Range Status   2023 5.1 3.5 - 5.1 mmol/L Final     Chloride   Date Value Ref Range Status   2023 104 95 - 110 mmol/L Final     CO2   Date Value Ref Range Status   2023 23 23 - 29 mmol/L Final     Glucose   Date Value Ref Range Status   2023 72 70 - 110 mg/dL Final     BUN   Date Value Ref Range Status   2023 13 6 - 20 mg/dL Final     Creatinine   Date Value Ref Range Status   2023 1.6 (H) 0.5 - 1.4 mg/dL Final     Calcium   Date Value Ref Range Status   2023 10.3 8.7 - 10.5 mg/dL Final     Total Protein   Date Value Ref Range Status   2023 7.7 6.0 - 8.4 " g/dL Final     Albumin   Date Value Ref Range Status   08/30/2023 4.8 3.5 - 5.2 g/dL Final     Total Bilirubin   Date Value Ref Range Status   08/30/2023 0.7 0.1 - 1.0 mg/dL Final     Comment:     For infants and newborns, interpretation of results should be based  on gestational age, weight and in agreement with clinical  observations.    Premature Infant recommended reference ranges:  Up to 24 hours.............<8.0 mg/dL  Up to 48 hours............<12.0 mg/dL  3-5 days..................<15.0 mg/dL  6-29 days.................<15.0 mg/dL       Alkaline Phosphatase   Date Value Ref Range Status   08/30/2023 57 55 - 135 U/L Final     AST   Date Value Ref Range Status   08/30/2023 21 10 - 40 U/L Final     ALT   Date Value Ref Range Status   08/30/2023 14 10 - 44 U/L Final     Anion Gap   Date Value Ref Range Status   08/30/2023 15 8 - 16 mmol/L Final      Lab Results   Component Value Date    WBC 4.97 08/30/2023    HGB 15.2 08/30/2023    HCT 45.0 08/30/2023    MCV 97 08/30/2023     08/30/2023        Lab Results   Component Value Date    CHOL 210 (H) 08/30/2023     Lab Results   Component Value Date    HDL 70 08/30/2023     Lab Results   Component Value Date    LDLCALC 128.2 08/30/2023     Lab Results   Component Value Date    TRIG 59 08/30/2023     Lab Results   Component Value Date    CHOLHDL 33.3 08/30/2023     Lab Results   Component Value Date    HGBA1C 4.9 11/08/2022     BP Readings from Last 1 Encounters:   08/30/23 122/82       Food History  Meals: intermittent fasting// 2 meals per day + some snacks  Vegetables: daily  Fruit: daily  Drinks: water, coffee (black)  Alcohol: 1x/month    Exercise History:  same as last year (3-4 days per week, mostly cardio)    Cultural/Spiritual/Personal Preferences:  None identified    Support System:  family/friends    State of Change:  Precontemplation    Barriers to Change:  none    Diagnosis    No nutrition diagnosis at this time.    Intervention    RMR (Method:   Medardo Recinos):  1915 kcal  Activity Factor:  1.3    HOLLY:  2489 kcal    Goals:  1.  Continue current routine.       Nutrition Education  The following education was provided to the patient:  Complimented patient on proactive role in health maintenance.  Complimented patient on physical activity efforts.  Discussed nutrition-related lab values and dietary and/or lifestyle factors affecting them.    Patient verbalized understanding of nutrition education and recommendations received.    Handouts Provided  none    Monitoring/Evaluation    Monitor the following:  Weight  BMI  Caloric intake  Labs:  lipid panel, glucose, HgbA1c    Follow Up Plan:  Communication with referring healthcare provider is unnecessary at this time as patient presented as part of annual wellness exam.  However, will follow up with patient in 1-2 years.

## 2023-12-20 ENCOUNTER — OFFICE VISIT (OUTPATIENT)
Dept: INTERNAL MEDICINE | Facility: CLINIC | Age: 23
End: 2023-12-20
Payer: COMMERCIAL

## 2023-12-20 ENCOUNTER — CLINICAL SUPPORT (OUTPATIENT)
Dept: PULMONOLOGY | Facility: CLINIC | Age: 23
End: 2023-12-20

## 2023-12-20 ENCOUNTER — CLINICAL SUPPORT (OUTPATIENT)
Dept: INTERNAL MEDICINE | Facility: CLINIC | Age: 23
End: 2023-12-20
Payer: COMMERCIAL

## 2023-12-20 ENCOUNTER — CLINICAL SUPPORT (OUTPATIENT)
Dept: AUDIOLOGY | Facility: CLINIC | Age: 23
End: 2023-12-20

## 2023-12-20 ENCOUNTER — CLINICAL SUPPORT (OUTPATIENT)
Dept: INTERNAL MEDICINE | Facility: CLINIC | Age: 23
End: 2023-12-20

## 2023-12-20 VITALS
DIASTOLIC BLOOD PRESSURE: 52 MMHG | BODY MASS INDEX: 26.57 KG/M2 | HEIGHT: 72 IN | WEIGHT: 196.19 LBS | TEMPERATURE: 98 F | SYSTOLIC BLOOD PRESSURE: 111 MMHG | HEART RATE: 43 BPM

## 2023-12-20 DIAGNOSIS — Z00.00 ROUTINE GENERAL MEDICAL EXAMINATION AT A HEALTH CARE FACILITY: Primary | ICD-10-CM

## 2023-12-20 DIAGNOSIS — Z00.00 ROUTINE GENERAL MEDICAL EXAMINATION AT A HEALTH CARE FACILITY: ICD-10-CM

## 2023-12-20 DIAGNOSIS — Z01.12 HEARING CONSERVATION AND TREATMENT EXAM: Primary | ICD-10-CM

## 2023-12-20 DIAGNOSIS — R00.1 SINUS BRADYCARDIA: ICD-10-CM

## 2023-12-20 LAB
ALBUMIN SERPL BCP-MCNC: 4.9 G/DL (ref 3.5–5.2)
ALP SERPL-CCNC: 52 U/L (ref 55–135)
ALT SERPL W/O P-5'-P-CCNC: 17 U/L (ref 10–44)
ANION GAP SERPL CALC-SCNC: 11 MMOL/L (ref 8–16)
AST SERPL-CCNC: 27 U/L (ref 10–40)
BILIRUB SERPL-MCNC: 0.5 MG/DL (ref 0.1–1)
BILIRUB UR QL STRIP: NEGATIVE
BRPFT: NORMAL
BUN SERPL-MCNC: 20 MG/DL (ref 6–20)
CALCIUM SERPL-MCNC: 9.9 MG/DL (ref 8.7–10.5)
CHLORIDE SERPL-SCNC: 106 MMOL/L (ref 95–110)
CHOLEST SERPL-MCNC: 209 MG/DL (ref 120–199)
CHOLEST/HDLC SERPL: 3.2 {RATIO} (ref 2–5)
CLARITY UR: CLEAR
CO2 SERPL-SCNC: 24 MMOL/L (ref 23–29)
COLOR UR: YELLOW
CREAT SERPL-MCNC: 1.5 MG/DL (ref 0.5–1.4)
ERYTHROCYTE [DISTWIDTH] IN BLOOD BY AUTOMATED COUNT: 12.1 % (ref 11.5–14.5)
EST. GFR  (NO RACE VARIABLE): >60 ML/MIN/1.73 M^2
ESTIMATED AVG GLUCOSE: 97 MG/DL (ref 68–131)
FEF 25 75 LLN: 3.22
FEF 25 75 PRE REF: 48.2 %
FEF 25 75 REF: 5.07
FEV1 FVC LLN: 72
FEV1 FVC PRE REF: 74.3 %
FEV1 FVC REF: 84
FEV1 LLN: 3.96
FEV1 PRE REF: 83.2 %
FEV1 REF: 4.92
FVC LLN: 4.79
FVC PRE REF: 111.1 %
FVC REF: 5.93
GLUCOSE SERPL-MCNC: 81 MG/DL (ref 70–110)
GLUCOSE UR QL STRIP: NEGATIVE
HBA1C MFR BLD: 5 % (ref 4–5.6)
HCT VFR BLD AUTO: 42.6 % (ref 40–54)
HDLC SERPL-MCNC: 65 MG/DL (ref 40–75)
HDLC SERPL: 31.1 % (ref 20–50)
HGB BLD-MCNC: 15 G/DL (ref 14–18)
HGB UR QL STRIP: NEGATIVE
KETONES UR QL STRIP: NEGATIVE
LDLC SERPL CALC-MCNC: 131 MG/DL (ref 63–159)
LEUKOCYTE ESTERASE UR QL STRIP: NEGATIVE
MCH RBC QN AUTO: 32.5 PG (ref 27–31)
MCHC RBC AUTO-ENTMCNC: 35.2 G/DL (ref 32–36)
MCV RBC AUTO: 92 FL (ref 82–98)
NITRITE UR QL STRIP: NEGATIVE
NONHDLC SERPL-MCNC: 144 MG/DL
PEF LLN: 8.21
PEF PRE REF: 72.4 %
PEF REF: 10.67
PH UR STRIP: 6 [PH] (ref 5–8)
PLATELET # BLD AUTO: 213 K/UL (ref 150–450)
PMV BLD AUTO: 11.3 FL (ref 9.2–12.9)
POTASSIUM SERPL-SCNC: 4 MMOL/L (ref 3.5–5.1)
PRE FEF 25 75: 2.44 L/S
PRE FET 100: 9.07 SEC
PRE FEV1 FVC: 62.11 %
PRE FEV1: 4.09 L
PRE FVC: 6.59 L
PRE PEF: 7.73 L/S
PROT SERPL-MCNC: 7.8 G/DL (ref 6–8.4)
PROT UR QL STRIP: NEGATIVE
RBC # BLD AUTO: 4.61 M/UL (ref 4.6–6.2)
SODIUM SERPL-SCNC: 141 MMOL/L (ref 136–145)
SP GR UR STRIP: <=1.005 (ref 1–1.03)
TESTOST SERPL-MCNC: 592 NG/DL (ref 304–1227)
TRIGL SERPL-MCNC: 65 MG/DL (ref 30–150)
URN SPEC COLLECT METH UR: ABNORMAL
WBC # BLD AUTO: 5.85 K/UL (ref 3.9–12.7)

## 2023-12-20 PROCEDURE — 83036 HEMOGLOBIN GLYCOSYLATED A1C: CPT | Performed by: FAMILY MEDICINE

## 2023-12-20 PROCEDURE — 99395 PREV VISIT EST AGE 18-39: CPT | Mod: S$GLB,,, | Performed by: FAMILY MEDICINE

## 2023-12-20 PROCEDURE — 1160F RVW MEDS BY RX/DR IN RCRD: CPT | Mod: CPTII,S$GLB,, | Performed by: FAMILY MEDICINE

## 2023-12-20 PROCEDURE — 3008F PR BODY MASS INDEX (BMI) DOCUMENTED: ICD-10-PCS | Mod: CPTII,S$GLB,, | Performed by: FAMILY MEDICINE

## 2023-12-20 PROCEDURE — 99999 PR PBB SHADOW E&M-EST. PATIENT-LVL I: ICD-10-PCS | Mod: PBBFAC,,,

## 2023-12-20 PROCEDURE — 99999 PR PBB SHADOW E&M-EST. PATIENT-LVL I: CPT | Mod: PBBFAC,,,

## 2023-12-20 PROCEDURE — 1159F PR MEDICATION LIST DOCUMENTED IN MEDICAL RECORD: ICD-10-PCS | Mod: CPTII,S$GLB,, | Performed by: FAMILY MEDICINE

## 2023-12-20 PROCEDURE — 80053 COMPREHEN METABOLIC PANEL: CPT | Performed by: FAMILY MEDICINE

## 2023-12-20 PROCEDURE — 99999 PR PBB SHADOW E&M-EST. PATIENT-LVL III: CPT | Mod: PBBFAC,,, | Performed by: FAMILY MEDICINE

## 2023-12-20 PROCEDURE — 92552 PURE TONE AUDIOMETRY AIR: CPT | Mod: S$GLB,,,

## 2023-12-20 PROCEDURE — 99999 PR PBB SHADOW E&M-EST. PATIENT-LVL III: ICD-10-PCS | Mod: PBBFAC,,, | Performed by: FAMILY MEDICINE

## 2023-12-20 PROCEDURE — 99211 PR NURSE VISIT, 15 MINS, EXEC HLTH ONLY: ICD-10-PCS | Mod: S$GLB,,, | Performed by: INTERNAL MEDICINE

## 2023-12-20 PROCEDURE — 3008F BODY MASS INDEX DOCD: CPT | Mod: CPTII,S$GLB,, | Performed by: FAMILY MEDICINE

## 2023-12-20 PROCEDURE — 3044F HG A1C LEVEL LT 7.0%: CPT | Mod: CPTII,S$GLB,, | Performed by: FAMILY MEDICINE

## 2023-12-20 PROCEDURE — 3044F PR MOST RECENT HEMOGLOBIN A1C LEVEL <7.0%: ICD-10-PCS | Mod: CPTII,S$GLB,, | Performed by: FAMILY MEDICINE

## 2023-12-20 PROCEDURE — 1159F MED LIST DOCD IN RCRD: CPT | Mod: CPTII,S$GLB,, | Performed by: FAMILY MEDICINE

## 2023-12-20 PROCEDURE — 81003 URINALYSIS AUTO W/O SCOPE: CPT | Performed by: FAMILY MEDICINE

## 2023-12-20 PROCEDURE — 1160F PR REVIEW ALL MEDS BY PRESCRIBER/CLIN PHARMACIST DOCUMENTED: ICD-10-PCS | Mod: CPTII,S$GLB,, | Performed by: FAMILY MEDICINE

## 2023-12-20 PROCEDURE — 84403 ASSAY OF TOTAL TESTOSTERONE: CPT | Performed by: FAMILY MEDICINE

## 2023-12-20 PROCEDURE — 3078F PR MOST RECENT DIASTOLIC BLOOD PRESSURE < 80 MM HG: ICD-10-PCS | Mod: CPTII,S$GLB,, | Performed by: FAMILY MEDICINE

## 2023-12-20 PROCEDURE — 85027 COMPLETE CBC AUTOMATED: CPT | Performed by: FAMILY MEDICINE

## 2023-12-20 PROCEDURE — 94010 BREATHING CAPACITY TEST: ICD-10-PCS | Mod: S$GLB,,, | Performed by: INTERNAL MEDICINE

## 2023-12-20 PROCEDURE — 99211 OFF/OP EST MAY X REQ PHY/QHP: CPT | Mod: S$GLB,,, | Performed by: INTERNAL MEDICINE

## 2023-12-20 PROCEDURE — 97802 PR MED NUTR THER, 1ST, INDIV, EA 15 MIN: ICD-10-PCS | Mod: S$GLB,,, | Performed by: DIETITIAN, REGISTERED

## 2023-12-20 PROCEDURE — 94010 BREATHING CAPACITY TEST: CPT | Mod: S$GLB,,, | Performed by: INTERNAL MEDICINE

## 2023-12-20 PROCEDURE — 3074F SYST BP LT 130 MM HG: CPT | Mod: CPTII,S$GLB,, | Performed by: FAMILY MEDICINE

## 2023-12-20 PROCEDURE — 3078F DIAST BP <80 MM HG: CPT | Mod: CPTII,S$GLB,, | Performed by: FAMILY MEDICINE

## 2023-12-20 PROCEDURE — 3074F PR MOST RECENT SYSTOLIC BLOOD PRESSURE < 130 MM HG: ICD-10-PCS | Mod: CPTII,S$GLB,, | Performed by: FAMILY MEDICINE

## 2023-12-20 PROCEDURE — 83655 ASSAY OF LEAD: CPT | Performed by: FAMILY MEDICINE

## 2023-12-20 PROCEDURE — 99395 PR PREVENTIVE VISIT,EST,18-39: ICD-10-PCS | Mod: S$GLB,,, | Performed by: FAMILY MEDICINE

## 2023-12-20 PROCEDURE — 92552 PR PURE TONE AUDIOMETRY, AIR: ICD-10-PCS | Mod: S$GLB,,,

## 2023-12-20 PROCEDURE — 97802 MEDICAL NUTRITION INDIV IN: CPT | Mod: S$GLB,,, | Performed by: DIETITIAN, REGISTERED

## 2023-12-20 PROCEDURE — 80061 LIPID PANEL: CPT | Performed by: FAMILY MEDICINE

## 2023-12-20 NOTE — LETTER
December 20, 2023    Lisa Kat  [address] [facility] [address]             [address]  [address] [address] [facility] [address]  Phone: [phone]  Fax: [phone] Dear Mr. Kat,    December 20, 2023, it was a pleasure to see you again and perform your Executive Health evaluation. At the time of this visit, you are 23 years old. You requested your testosterone level be checked today. You denied any specific complaints or concerns during todays visit.      YOUR PAST MEDICAL HISTORY includes bradycardia.      YOUR PAST SURGICAL HISTORY includes a left scaphoid fracture repair, a tonsillectomy, and adenoidectomy.    You do not take ANY REGULAR MEDICATIONS.    You do not have ANY KNOWN DRUG ALLERGIES.    YOUR SOCIAL HISTORY includes employment by the [employer]. You are  with one child. You vape infrequently. You drink approximately three alcoholic beverages per week.  A You denied any illicit drug use.    YOUR FAMILY HISTORY includes your father, alive, with no known medical problems. Your mother, alive, with no known medical problems. No known colon or prostate cancer.    YOUR ROUTINE HEALTH MAINTENANCE includes a tetanus booster in September 2019, no recent flu vaccines, and no previous COVID vaccines.    PHYSICAL EXAMINATION: You are  6 feet tall, weighing 196 pounds with a body mass index of 26.  You remain in the Overweight category. Your blood pressure is 1111/52.. Your heart rate is 43 beats per minute. All of these are acceptable values. Your physical examination did not reveal any significant abnormal findings.    YOUR BLOOD WORK AND ADDITIONAL TESTS: Reveal normal white cell counts, red cell counts, and platelet levels. You are not Anemic. Your glucose, liver, and electrolyte tests are normal.  Your creatinine is 1.5 and mildly elevated but your count related kidney function is normal. Your total cholesterol is 209. Your  triglycerides are 65. Your HDL is 65. Your LDL is  131. Based on these numbers, your 10-year risk of heart attack or stroke is <1%. Your hemoglobin A1c is 5%, which is in a normal range. You do not have Prediabetes or Diabetes. Your urinalysis is normal.  Your testosterone level is 592, which is in a normal range. Your serum lead level is normal.      Your pulmonary function testing shows mild obstructive pattern.    Your Audiology exam results revealed normal hearing sensitivity 250-8000 Hz, bilaterally. Wearing hearing protective devices around loud noises is recommended..    In summary, you are overall in excellent health. Your 10-year risk of heart attack or stroke is low. Daily aspirin or cholesterol medications are not recommended.  Your creatinine is elevated, but your calculated kidney function is normal, no additional evaluation or treatment as needed.  Since you do not have any active pulmonary complaints, no additional evaluation or treatment as needed for the mild obstructive pattern on your pulmonary function test. You should have repeat pulmonary function testing next year.    Based on the [place] Preventive Task Force recommendations, you should receive yearly Influenza vaccinations. You should have a Tetanus booster every 10 years. You should complete the primary COVID vaccine series. You should start screening for Colon cancer at 45 years old.    It was a pleasure to see you again and perform this Executive Health evaluation. If I can be of any further assistance, or if you have any additional questions or concerns, please do not hesitate to let me know.    Sincerely,      Anthony Ricardo MD, FAAFP

## 2023-12-20 NOTE — PROGRESS NOTES
Executive Health Screening    Lisa Kat was seen 12/20/2023 for an executive health hearing screen. Otoscopy was unremarkable in both ears. Results revealed normal hearing sensitivity 250-8000 Hz, bilaterally.    Patient was counseled on the above findings.     Recommendations:  1. Consistent use of hearing protective devices when around loud noise.  2. Annual hearing screenings.

## 2023-12-20 NOTE — PROGRESS NOTES
Subjective:   Patient ID: Lisa Kat is a 23 y.o. male.  Chief Complaint:  Executive Health    Executive Health evaluation .   Past medical history for bradycardia  Past Surgical history for scaphoid fracture left wrist, tonsillectomy, adenoidectomy   No current medications  No drug allergies   Social history employed by [employer]  .  One child.  Vapes infrequently.  Averages 3 alcoholic beverages per week.  No illicit drug use.    Family history includes mother and father both alive and healthy.  No colon cancer or prostate cancer.    Health maintenance with tetanus booster September 2019, no recent flu vaccine, no previous COVID vaccines   No specific complaints or concerns today      Objective:   BP (!) 111/52   Pulse (!) 43   Temp 97.5 °F (36.4 °C)   Ht 6' (1.829 m)   Wt 89 kg (196 lb 3.4 oz)   BMI 26.61 kg/m²     Physical Exam  Vitals and nursing note reviewed.   Constitutional:       Appearance: Normal appearance. He is well-developed and overweight.   HENT:      Right Ear: Tympanic membrane and ear canal normal.      Left Ear: Tympanic membrane and ear canal normal.      Nose: No congestion or rhinorrhea.      Mouth/Throat:      Pharynx: Oropharynx is clear. Uvula midline. No pharyngeal swelling, oropharyngeal exudate or posterior oropharyngeal erythema.   Neck:      Thyroid: No thyroid mass, thyromegaly or thyroid tenderness.   Cardiovascular:      Rate and Rhythm: Regular rhythm. Bradycardia present.      Heart sounds: Normal heart sounds.   Pulmonary:      Effort: Pulmonary effort is normal.      Breath sounds: Normal breath sounds.   Abdominal:      General: There is no distension.      Palpations: Abdomen is soft.      Tenderness: There is no abdominal tenderness.   Skin:     Findings: No rash.   Psychiatric:         Mood and Affect: Mood and affect normal.     CBC normal   CMP with creatinine 1.5, calculated EGFR normal.  Glucose, kidney, liver testing normal.  Total  cholesterol 2 9.  Triglycerides 65.  HDL 65.  .  Ten year cardiovascular risk 1%.    A1c 5.0%   Urinalysis specific gravity 1.005, otherwise normal   Testosterone level 592 and normal   Lead level pending     Pulmonary function tests with mild obstructive pattern    Audiology exam results revealed normal hearing sensitivity 250-8000 Hz, bilaterally     Assessment:       ICD-10-CM ICD-9-CM   1. Routine general medical examination at a health care facility  Z00.00 V70.0   2. Sinus bradycardia  R00.1 427.89     Plan:   Routine general medical examination at a health care facility  -     TESTOSTERONE; Future; Expected date: 12/20/2023  Blood pressure normal.  BMI 26.    Send full executive Health letter when all test resulted   Colon cancer screening at 45 years old   Tetanus booster up-to-date  Declines COVID vaccine  Declines flu vaccine     Sinus bradycardia  Stable   Asymptomatic   No additional evaluation treatment needed    Return to clinic 1 year for executive Health evaluation or sooner if needed

## 2023-12-22 LAB
CITY: NORMAL
COUNTY: NORMAL
GUARDIAN FIRST NAME: NORMAL
GUARDIAN LAST NAME: NORMAL
LEAD BLD-MCNC: <1 MCG/DL
PHONE #: NORMAL
POSTAL CODE: NORMAL
RACE: NORMAL
STATE OF RESIDENCE: NORMAL
STREET ADDRESS: NORMAL

## 2024-07-25 ENCOUNTER — CLINICAL SUPPORT (OUTPATIENT)
Dept: INTERNAL MEDICINE | Facility: CLINIC | Age: 24
End: 2024-07-25
Payer: COMMERCIAL

## 2024-07-25 ENCOUNTER — OFFICE VISIT (OUTPATIENT)
Dept: INTERNAL MEDICINE | Facility: CLINIC | Age: 24
End: 2024-07-25
Payer: COMMERCIAL

## 2024-07-25 ENCOUNTER — CLINICAL SUPPORT (OUTPATIENT)
Dept: PULMONOLOGY | Facility: CLINIC | Age: 24
End: 2024-07-25

## 2024-07-25 ENCOUNTER — CLINICAL SUPPORT (OUTPATIENT)
Dept: AUDIOLOGY | Facility: CLINIC | Age: 24
End: 2024-07-25

## 2024-07-25 ENCOUNTER — CLINICAL SUPPORT (OUTPATIENT)
Dept: INTERNAL MEDICINE | Facility: CLINIC | Age: 24
End: 2024-07-25

## 2024-07-25 VITALS
TEMPERATURE: 98 F | OXYGEN SATURATION: 97 % | HEART RATE: 43 BPM | RESPIRATION RATE: 18 BRPM | DIASTOLIC BLOOD PRESSURE: 56 MMHG | SYSTOLIC BLOOD PRESSURE: 107 MMHG | HEIGHT: 72 IN | WEIGHT: 202 LBS | BODY MASS INDEX: 27.36 KG/M2

## 2024-07-25 DIAGNOSIS — Z00.00 ROUTINE GENERAL MEDICAL EXAMINATION AT A HEALTH CARE FACILITY: Primary | ICD-10-CM

## 2024-07-25 DIAGNOSIS — Z01.12 HEARING CONSERVATION AND TREATMENT EXAM: Primary | ICD-10-CM

## 2024-07-25 DIAGNOSIS — Z00.00 ROUTINE GENERAL MEDICAL EXAMINATION AT A HEALTH CARE FACILITY: ICD-10-CM

## 2024-07-25 DIAGNOSIS — Z00.00 PREVENTATIVE HEALTH CARE: Primary | ICD-10-CM

## 2024-07-25 LAB
ALBUMIN SERPL BCP-MCNC: 4.4 G/DL (ref 3.5–5.2)
ALP SERPL-CCNC: 46 U/L (ref 55–135)
ALT SERPL W/O P-5'-P-CCNC: 24 U/L (ref 10–44)
ANION GAP SERPL CALC-SCNC: 10 MMOL/L (ref 8–16)
AST SERPL-CCNC: 23 U/L (ref 10–40)
BILIRUB SERPL-MCNC: 0.7 MG/DL (ref 0.1–1)
BILIRUB UR QL STRIP: NEGATIVE
BUN SERPL-MCNC: 18 MG/DL (ref 6–20)
CALCIUM SERPL-MCNC: 9.6 MG/DL (ref 8.7–10.5)
CHLORIDE SERPL-SCNC: 106 MMOL/L (ref 95–110)
CHOLEST SERPL-MCNC: 206 MG/DL (ref 120–199)
CHOLEST/HDLC SERPL: 3 {RATIO} (ref 2–5)
CLARITY UR: CLEAR
CO2 SERPL-SCNC: 26 MMOL/L (ref 23–29)
COLOR UR: YELLOW
CREAT SERPL-MCNC: 1.5 MG/DL (ref 0.5–1.4)
ERYTHROCYTE [DISTWIDTH] IN BLOOD BY AUTOMATED COUNT: 12.1 % (ref 11.5–14.5)
EST. GFR  (NO RACE VARIABLE): >60 ML/MIN/1.73 M^2
ESTIMATED AVG GLUCOSE: 94 MG/DL (ref 68–131)
GLUCOSE SERPL-MCNC: 84 MG/DL (ref 70–110)
GLUCOSE UR QL STRIP: NEGATIVE
HBA1C MFR BLD: 4.9 % (ref 4–5.6)
HCT VFR BLD AUTO: 43.1 % (ref 40–54)
HDLC SERPL-MCNC: 68 MG/DL (ref 40–75)
HDLC SERPL: 33 % (ref 20–50)
HGB BLD-MCNC: 14.9 G/DL (ref 14–18)
HGB UR QL STRIP: NEGATIVE
KETONES UR QL STRIP: NEGATIVE
LDLC SERPL CALC-MCNC: 124.2 MG/DL (ref 63–159)
LEUKOCYTE ESTERASE UR QL STRIP: NEGATIVE
MCH RBC QN AUTO: 32.8 PG (ref 27–31)
MCHC RBC AUTO-ENTMCNC: 34.6 G/DL (ref 32–36)
MCV RBC AUTO: 95 FL (ref 82–98)
NITRITE UR QL STRIP: NEGATIVE
NONHDLC SERPL-MCNC: 138 MG/DL
PH UR STRIP: 6 [PH] (ref 5–8)
PLATELET # BLD AUTO: 229 K/UL (ref 150–450)
PMV BLD AUTO: 10.9 FL (ref 9.2–12.9)
POTASSIUM SERPL-SCNC: 4.3 MMOL/L (ref 3.5–5.1)
PROT SERPL-MCNC: 7.6 G/DL (ref 6–8.4)
PROT UR QL STRIP: NEGATIVE
RBC # BLD AUTO: 4.54 M/UL (ref 4.6–6.2)
SODIUM SERPL-SCNC: 142 MMOL/L (ref 136–145)
SP GR UR STRIP: 1.02 (ref 1–1.03)
TRIGL SERPL-MCNC: 69 MG/DL (ref 30–150)
URN SPEC COLLECT METH UR: NORMAL
WBC # BLD AUTO: 5.15 K/UL (ref 3.9–12.7)

## 2024-07-25 PROCEDURE — 85027 COMPLETE CBC AUTOMATED: CPT | Performed by: FAMILY MEDICINE

## 2024-07-25 PROCEDURE — 80061 LIPID PANEL: CPT | Performed by: FAMILY MEDICINE

## 2024-07-25 PROCEDURE — 3008F BODY MASS INDEX DOCD: CPT | Mod: CPTII,S$GLB,, | Performed by: FAMILY MEDICINE

## 2024-07-25 PROCEDURE — 99999 PR PBB SHADOW E&M-EST. PATIENT-LVL I: CPT | Mod: PBBFAC,,,

## 2024-07-25 PROCEDURE — 80053 COMPREHEN METABOLIC PANEL: CPT | Performed by: FAMILY MEDICINE

## 2024-07-25 PROCEDURE — 3044F HG A1C LEVEL LT 7.0%: CPT | Mod: CPTII,S$GLB,, | Performed by: FAMILY MEDICINE

## 2024-07-25 PROCEDURE — 1160F RVW MEDS BY RX/DR IN RCRD: CPT | Mod: CPTII,S$GLB,, | Performed by: FAMILY MEDICINE

## 2024-07-25 PROCEDURE — 83036 HEMOGLOBIN GLYCOSYLATED A1C: CPT | Performed by: FAMILY MEDICINE

## 2024-07-25 PROCEDURE — 1159F MED LIST DOCD IN RCRD: CPT | Mod: CPTII,S$GLB,, | Performed by: FAMILY MEDICINE

## 2024-07-25 PROCEDURE — 83655 ASSAY OF LEAD: CPT | Performed by: FAMILY MEDICINE

## 2024-07-25 PROCEDURE — 99395 PREV VISIT EST AGE 18-39: CPT | Mod: S$GLB,,, | Performed by: FAMILY MEDICINE

## 2024-07-25 PROCEDURE — 3078F DIAST BP <80 MM HG: CPT | Mod: CPTII,S$GLB,, | Performed by: FAMILY MEDICINE

## 2024-07-25 PROCEDURE — 3074F SYST BP LT 130 MM HG: CPT | Mod: CPTII,S$GLB,, | Performed by: FAMILY MEDICINE

## 2024-07-25 PROCEDURE — 99999 PR PBB SHADOW E&M-EST. PATIENT-LVL III: CPT | Mod: PBBFAC,,, | Performed by: FAMILY MEDICINE

## 2024-07-25 PROCEDURE — 81003 URINALYSIS AUTO W/O SCOPE: CPT | Performed by: FAMILY MEDICINE

## 2024-07-25 NOTE — PROGRESS NOTES
"Nutrition Assessment  Session Time:  30 minutes      Client name:  Lisa Kat  :  2000  Age:  24 y.o.  Gender:  male    Client states:  [employer] employee.  Present for nutrition counseling as part of his annual physical.  Last seen: 2023      Reports exercising weekly, making attempts at daily fruits and vegetables, and consumes an overall diet low in added sugars.     No questions or concerns today.   Reports no known changes to health.   Plans to continue current routine.     Anthropometrics  Height:  72"     Weight:  202 lbs  BMI:  27.40  % Body Fat:  n/a    Clinical Signs/Symptoms  N/V/D:  none noted  Appetite:  good       Past Medical History:   Diagnosis Date    Bradycardia     Tinnitus, right ear        Past Surgical History:   Procedure Laterality Date    ADENOIDECTOMY      SCAPHOID FRACTURE SURGERY Left     TONSILLECTOMY         Medications    currently has no medications in their medication list.    Vitamins, Minerals, and/or Supplements:  not discussed     Food/Medication Interactions:  Reviewed     Food Allergies or Intolerances:  NKFA noted in chart     Social History    Marital status:  Single  Employment:  yes    Social History     Tobacco Use    Smoking status: Never    Smokeless tobacco: Never   Substance Use Topics    Alcohol use: Yes     Alcohol/week: 3.0 standard drinks of alcohol     Types: 3 Drinks containing 0.5 oz of alcohol per week        Lab Reports   Sodium   Date Value Ref Range Status   2023 141 136 - 145 mmol/L Final     Potassium   Date Value Ref Range Status   2023 4.0 3.5 - 5.1 mmol/L Final     Chloride   Date Value Ref Range Status   2023 106 95 - 110 mmol/L Final     CO2   Date Value Ref Range Status   2023 24 23 - 29 mmol/L Final     Glucose   Date Value Ref Range Status   2023 81 70 - 110 mg/dL Final     BUN   Date Value Ref Range Status   2023 20 6 - 20 mg/dL Final     Creatinine   Date Value Ref Range " Status   12/20/2023 1.5 (H) 0.5 - 1.4 mg/dL Final     Calcium   Date Value Ref Range Status   12/20/2023 9.9 8.7 - 10.5 mg/dL Final     Total Protein   Date Value Ref Range Status   12/20/2023 7.8 6.0 - 8.4 g/dL Final     Albumin   Date Value Ref Range Status   12/20/2023 4.9 3.5 - 5.2 g/dL Final     Total Bilirubin   Date Value Ref Range Status   12/20/2023 0.5 0.1 - 1.0 mg/dL Final     Comment:     For infants and newborns, interpretation of results should be based  on gestational age, weight and in agreement with clinical  observations.    Premature Infant recommended reference ranges:  Up to 24 hours.............<8.0 mg/dL  Up to 48 hours............<12.0 mg/dL  3-5 days..................<15.0 mg/dL  6-29 days.................<15.0 mg/dL       Alkaline Phosphatase   Date Value Ref Range Status   12/20/2023 52 (L) 55 - 135 U/L Final     AST   Date Value Ref Range Status   12/20/2023 27 10 - 40 U/L Final     ALT   Date Value Ref Range Status   12/20/2023 17 10 - 44 U/L Final     Anion Gap   Date Value Ref Range Status   12/20/2023 11 8 - 16 mmol/L Final      Lab Results   Component Value Date    WBC 5.85 12/20/2023    HGB 15.0 12/20/2023    HCT 42.6 12/20/2023    MCV 92 12/20/2023     12/20/2023        Lab Results   Component Value Date    CHOL 209 (H) 12/20/2023     Lab Results   Component Value Date    HDL 65 12/20/2023     Lab Results   Component Value Date    LDLCALC 131.0 12/20/2023     Lab Results   Component Value Date    TRIG 65 12/20/2023     Lab Results   Component Value Date    CHOLHDL 31.1 12/20/2023     Lab Results   Component Value Date    HGBA1C 5.0 12/20/2023     BP Readings from Last 1 Encounters:   01/08/24 108/70       Food History  No recall    Exercise History:  several days per week    Cultural/Spiritual/Personal Preferences:  None identified    Support System:  family/friends    State of Change:  Precontemplation    Barriers to Change:  none    Diagnosis    No nutrition diagnosis at  this time.    Intervention    RMR (Method:  [place] Jeor):  1937 kcal  Activity Factor:  1.3    HOLLY:  2518 kcal    Goals:  1.  Continue current routine.      Nutrition Education  The following education was provided to the patient:  Complimented patient on proactive role in health maintenance.  Complimented patient on physical activity efforts.  *Lab results were pending at time of consult and so, not discussed with patient.    Patient verbalized understanding of nutrition education and recommendations received.    Handouts Provided  none    Monitoring/Evaluation    Monitor the following:  Weight  BMI  Caloric intake  Labs:  lipid panel, glucose, HgbA1c    Follow Up Plan:  Communication with referring healthcare provider is unnecessary at this time as patient presented as part of annual wellness exam.  However, will follow up with patient in 1-2 years.

## 2024-07-25 NOTE — LETTER
"Dear Lisa,    It was a pleasure seeing you for your recent Executive Health Exam. I want to take a moment to thank you for your service as a  with the [facility] Department. Your bravery and dedication to keeping our community safe does not go unnoticed--you truly are an everyday hero.    I'm writing to provide you with a summary of your Executive Health Exam and test results.    During your visit, we focused on your overall preventive health care.    Your vital signs during the exam were as follows: blood pressure 107/56, pulse 43, temperature 97.7°F, and BMI 27.40 kg/m². Your blood pressure is in a healthy range, which is generally considered to be below 120/80 mmHg. Your BMI is classified as "overweight" (25-29.9 kg/m²), so maintaining a healthy diet and regular exercise could help bring this down into the normal range. Historically, your weight and BMI have shown some fluctuation, but its encouraging to see that your recent trends indicate stability, which is a step in the right direction.    In terms of your social history, its great to see that youve never used tobacco products. Your alcohol intake is moderate, which is perfectly fine. Keeping these healthy habits will continue to benefit your overall health.    Reviewing your social determinants of health, your risk for depression is low, which is reassuring. There was no additional information on file in other categories, so theres nothing more to address in this area.    As of your visit, youre not currently taking any medications.    Regarding your lab results:  - Your Lipid Panel, which measures your cholesterol levels, showed that your total cholesterol was slightly elevated at 206 mg/dL. Your HDL ("good" cholesterol) was at a healthy level of 68 mg/dL, which is great, and your LDL ("bad" cholesterol) was within an acceptable range. While your total cholesterol is a bit high, the rest of your lipid profile looks excellent. Looking " at your historical lab data, your cholesterol levels have remained relatively stable.  - Your Comprehensive Metabolic Panel (CMP), which assesses kidney and liver function, electrolytes, and blood sugar levels, was mostly normal. The only exception was a slightly elevated creatinine level, which was 1.5 mg/dL. This is something well continue to monitor, but it's not immediately concerning.  - Your Complete Blood Count (CBC) was normal overall. There were no clinically significant abnormalities to be concerned about.  - Your urinalysis was normal.  - Your serum lead level was normal.    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 This condition was stable compared to your previous test, which is a positive sign.    Your audiometry test indicated normal hearing sensitivity, with the exception of a mild hearing loss at 4000 Hz in your right ear. Its important to consistently use hearing protection when exposed to loud noises and to have your hearing screened annually.    In terms of health maintenance, you are up to date on your tetanus and influenza vaccines, which is great. However, you are due for the HPV vaccine series and the COVID-19 vaccine for the 2023-24 season. If youve already completed these vaccines elsewhere, please send me the documentation so we can update your records. Otherwise, I encourage you to get these done soon.    To maintain your physical and mental health, I recommend continuing regular exercise, following a balanced diet, and taking time for stress-relieving activities. Simple changes, like increasing your fruit and vegetable intake or adding a few more minutes of physical activity each day, can have a big impact.    Since I am not your primary care physician, I trust you will follow up with Dr. Anthony Ricardo for any new concerns. However, if you have any questions about your Executive Health Exam results, please feel free to message me.    Thank you for trusting me  and [facility] with your healthcare.    Warmest regards,     MANUEL Metzger MD

## 2024-07-25 NOTE — PROGRESS NOTES
EXECUTIVE [facility] ENCOUNTER  7/25/24      REASON FOR VISIT  [facility]    PCP (Primary Care Provider)  Lisa identifies Anthony Ricardo MD as his PCP.    HISTORY  Lisa reports that he is doing well. He reports no complaints or concerns.     Review of Systems   Constitutional: Negative.    HENT: Negative.     Eyes: Negative.    Respiratory: Negative.     Cardiovascular: Negative.    Gastrointestinal: Negative.    Endocrine: Negative.    Musculoskeletal: Negative.    Integumentary:  Negative.   Neurological: Negative.    Psychiatric/Behavioral: Negative.         ASSESSMENT/PLAN  1. Preventative health care      PHYSICAL EXAM  Vitals:    07/25/24 0814   BP: (!) 107/56   Pulse: (!) 43   Resp: 18   Temp: 97.7 °F (36.5 °C)   SpO2: 97%   Weight: 91.6 kg (202 lb)   Height: 6' (1.829 m)   Physical Exam  Vitals reviewed.   Constitutional:       General: He is not in acute distress.     Appearance: Normal appearance. He is not ill-appearing, toxic-appearing or diaphoretic.   HENT:      Head: Normocephalic and atraumatic.      Right Ear: Tympanic membrane, ear canal and external ear normal.      Left Ear: Tympanic membrane, ear canal and external ear normal.   Eyes:      General: No scleral icterus.     Conjunctiva/sclera: Conjunctivae normal.   Neck:      Vascular: No carotid bruit.   Cardiovascular:      Rate and Rhythm: Normal rate and regular rhythm.      Heart sounds: Normal heart sounds.   Pulmonary:      Effort: Pulmonary effort is normal.      Breath sounds: Normal breath sounds.   Abdominal:      General: Bowel sounds are normal. There is no distension.      Palpations: Abdomen is soft. There is no mass.      Tenderness: There is no abdominal tenderness.   Musculoskeletal:         General: No tenderness.      Cervical back: No muscular tenderness.   Lymphadenopathy:      Cervical: No cervical adenopathy.   Skin:     General: Skin is warm and dry.      Coloration: Skin is not jaundiced.   Neurological:       General: No focal deficit present.      Mental Status: He is alert and oriented to person, place, and time.      Cranial Nerves: No cranial nerve deficit.      Gait: Gait normal.   Psychiatric:         Mood and Affect: Mood normal.         Behavior: Behavior normal.         Judgment: Judgment normal.         MEDICATIONS  Lisa currently has no medications in their medication list.    HEALTH MAINTENANCE AND SCREENINGS - UP TO DATE  Health Maintenance Topics with due status: Not Due       Topic Last Completion Date    TETANUS VACCINE 09/16/2019    Influenza Vaccine 10/27/2021     HEALTH MAINTENANCE AND SCREENINGS - DUE OR DUE SOON  Health Maintenance Due   Topic Date Due    HPV Vaccines (1 - Male 3-dose series) Never done    COVID-19 Vaccine (1 - 2023-24 season) Never done     FOLLOW-UP  Lisa is to follow up with his PCP, Dr. Ricardo, for any health problems or concerns, any abnormal test results, and any age-appropriate health maintenance interventions and screenings that may be due.    PROBLEM LIST  Lisa has Cardiac arrhythmia and Sinus bradycardia on their problem list.    PAST MEDICAL HISTORY  Lisa has a past medical history of Bradycardia and Tinnitus, right ear.    SURGICAL HISTORY  Lisa has a past surgical history that includes Tonsillectomy; Scaphoid fracture surgery (Left); and Adenoidectomy.    FAMILY HISTORY  Lisa family history includes No Known Problems in his father and mother.     ALLERGIES  Lisa reports he has No Known Allergies.    SOCIAL HISTORY  Lisa  reports that he has never smoked. He has never used smokeless tobacco. He reports current alcohol use of about 3.0 standard drinks of alcohol per week. He reports that he does not use drugs.     This note was generated with the assistance of ambient listening technology. Verbal consent was obtained by the patient and accompanying visitor(s) for the recording of patient appointment to facilitate this note. I attest to having reviewed and  "edited the generated note for accuracy, though some syntax or spelling errors may persist. Please contact the author of this note for any clarification.    Documentation entered by me for this encounter may have been done in part using speech-recognition technology. Although I have made an effort to ensure accuracy, "sound like" errors may exist and should be interpreted in context.  "

## 2024-07-25 NOTE — PROGRESS NOTES
Executive Health Screening    Lisa Kat was seen 07/25/2024 for an executive health hearing screen. Otoscopy was unremarkable in both ears. Results revealed normal hearing sensitivity in both ears with the exception of a mild hearing loss at 4000 Hz in the right ear.     Patient was counseled on the above findings.     Recommendations:  1. Consistent use of hearing protective devices when around loud noise.  2. Annual hearing screenings.

## 2024-07-26 LAB
BRPFT: ABNORMAL
CITY: NORMAL
COUNTY: NORMAL
FEF 25 75 LLN: 2.83
FEF 25 75 PRE REF: 53.4 %
FEF 25 75 REF: 4.54
FEV1 FVC LLN: 74
FEV1 FVC PRE REF: 70.7 %
FEV1 FVC REF: 86
FEV1 LLN: 2.63
FEV1 PRE REF: 135.5 %
FEV1 REF: 3.27
FVC LLN: 3.08
FVC PRE REF: 191.5 %
FVC REF: 3.81
GUARDIAN FIRST NAME: NORMAL
GUARDIAN LAST NAME: NORMAL
LEAD BLD-MCNC: <1 MCG/DL
PEF LLN: 6.42
PEF PRE REF: 117.7 %
PEF REF: 8.12
PHONE #: NORMAL
POSTAL CODE: NORMAL
PRE FEF 25 75: 2.42 L/S (ref 2.83–6.25)
PRE FET 100: 13.41 SEC
PRE FEV1 FVC: 60.74 %
PRE FEV1: 4.43 L
PRE FVC: 7.29 L
PRE PEF: 9.56 L/S (ref 6.4–10.38)
RACE: NORMAL
STATE OF RESIDENCE: NORMAL
STREET ADDRESS: NORMAL

## 2025-04-07 DIAGNOSIS — Z00.00 ROUTINE GENERAL MEDICAL EXAMINATION AT A HEALTH CARE FACILITY: Primary | ICD-10-CM

## 2025-05-01 ENCOUNTER — CLINICAL SUPPORT (OUTPATIENT)
Dept: PULMONOLOGY | Facility: CLINIC | Age: 25
End: 2025-05-01

## 2025-05-01 ENCOUNTER — OFFICE VISIT (OUTPATIENT)
Dept: INTERNAL MEDICINE | Facility: CLINIC | Age: 25
End: 2025-05-01
Payer: COMMERCIAL

## 2025-05-01 ENCOUNTER — CLINICAL SUPPORT (OUTPATIENT)
Dept: INTERNAL MEDICINE | Facility: CLINIC | Age: 25
End: 2025-05-01
Payer: COMMERCIAL

## 2025-05-01 ENCOUNTER — RESULTS FOLLOW-UP (OUTPATIENT)
Dept: INTERNAL MEDICINE | Facility: CLINIC | Age: 25
End: 2025-05-01

## 2025-05-01 ENCOUNTER — HOSPITAL ENCOUNTER (OUTPATIENT)
Dept: CARDIOLOGY | Facility: HOSPITAL | Age: 25
Discharge: HOME | End: 2025-05-01

## 2025-05-01 ENCOUNTER — CLINICAL SUPPORT (OUTPATIENT)
Dept: INTERNAL MEDICINE | Facility: CLINIC | Age: 25
End: 2025-05-01

## 2025-05-01 VITALS
DIASTOLIC BLOOD PRESSURE: 67 MMHG | WEIGHT: 216 LBS | TEMPERATURE: 98 F | SYSTOLIC BLOOD PRESSURE: 105 MMHG | HEART RATE: 49 BPM | BODY MASS INDEX: 29.26 KG/M2 | HEIGHT: 72 IN

## 2025-05-01 DIAGNOSIS — R00.1 SINUS BRADYCARDIA: ICD-10-CM

## 2025-05-01 DIAGNOSIS — I45.6 WOLFF-PARKINSON-WHITE (WPW) PATTERN SEEN ON ELECTROCARDIOGRAPHY: ICD-10-CM

## 2025-05-01 DIAGNOSIS — Z00.00 ROUTINE GENERAL MEDICAL EXAMINATION AT A HEALTH CARE FACILITY: ICD-10-CM

## 2025-05-01 DIAGNOSIS — Z00.00 ROUTINE GENERAL MEDICAL EXAMINATION AT A HEALTH CARE FACILITY: Primary | ICD-10-CM

## 2025-05-01 DIAGNOSIS — Z12.9 CANCER SCREENING: Primary | ICD-10-CM

## 2025-05-01 LAB
25(OH)D3+25(OH)D2 SERPL-MCNC: 34 NG/ML (ref 30–96)
ALBUMIN SERPL BCP-MCNC: 4.7 G/DL (ref 3.5–5.2)
ALP SERPL-CCNC: 48 UNIT/L (ref 40–150)
ALT SERPL W/O P-5'-P-CCNC: 25 UNIT/L (ref 10–44)
ANION GAP (OHS): 9 MMOL/L (ref 8–16)
AST SERPL-CCNC: 29 UNIT/L (ref 11–45)
BILIRUB SERPL-MCNC: 0.7 MG/DL (ref 0.1–1)
BILIRUB UR QL STRIP.AUTO: NEGATIVE
BRPFT: NORMAL
BUN SERPL-MCNC: 23 MG/DL (ref 6–20)
CALCIUM SERPL-MCNC: 9.7 MG/DL (ref 8.7–10.5)
CHLORIDE SERPL-SCNC: 106 MMOL/L (ref 95–110)
CHOLEST SERPL-MCNC: 248 MG/DL (ref 120–199)
CHOLEST/HDLC SERPL: 3.2 {RATIO} (ref 2–5)
CLARITY UR: CLEAR
CO2 SERPL-SCNC: 23 MMOL/L (ref 23–29)
COLOR UR AUTO: YELLOW
CREAT SERPL-MCNC: 1.4 MG/DL (ref 0.5–1.4)
EAG (OHS): 94 MG/DL (ref 68–131)
ERYTHROCYTE [DISTWIDTH] IN BLOOD BY AUTOMATED COUNT: 12.1 % (ref 11.5–14.5)
FEF 25 75 LLN: 3.42
FEF 25 75 PRE REF: 41.4 %
FEF 25 75 REF: 5.13
FEV1 FVC LLN: 72
FEV1 FVC PRE REF: 71.1 %
FEV1 FVC REF: 83
FEV1 LLN: 3.92
FEV1 PRE REF: 83 %
FEV1 REF: 4.88
FVC LLN: 4.77
FVC PRE REF: 115.9 %
FVC REF: 5.91
GFR SERPLBLD CREATININE-BSD FMLA CKD-EPI: >60 ML/MIN/1.73/M2
GLUCOSE SERPL-MCNC: 75 MG/DL (ref 70–110)
GLUCOSE UR QL STRIP: NEGATIVE
HBA1C MFR BLD: 4.9 % (ref 4–5.6)
HCT VFR BLD AUTO: 43.3 % (ref 40–54)
HDLC SERPL-MCNC: 77 MG/DL (ref 40–75)
HDLC SERPL: 31 % (ref 20–50)
HGB BLD-MCNC: 15.1 GM/DL (ref 14–18)
HGB UR QL STRIP: NEGATIVE
HOLD SPECIMEN: NORMAL
KETONES UR QL STRIP: NEGATIVE
LDLC SERPL CALC-MCNC: 157 MG/DL (ref 63–159)
LEUKOCYTE ESTERASE UR QL STRIP: NEGATIVE
MCH RBC QN AUTO: 32.8 PG (ref 27–31)
MCHC RBC AUTO-ENTMCNC: 34.9 G/DL (ref 32–36)
MCV RBC AUTO: 94 FL (ref 82–98)
NITRITE UR QL STRIP: NEGATIVE
NONHDLC SERPL-MCNC: 171 MG/DL
OHS QRS DURATION: 138 MS
OHS QTC CALCULATION: 420 MS
PEF LLN: 8.23
PEF PRE REF: 80.7 %
PEF REF: 10.68
PH UR STRIP: 7 [PH]
PLATELET # BLD AUTO: 223 K/UL (ref 150–450)
PMV BLD AUTO: 11.6 FL (ref 9.2–12.9)
POTASSIUM SERPL-SCNC: 4.3 MMOL/L (ref 3.5–5.1)
PRE FEF 25 75: 2.13 L/S
PRE FET 100: 12.86 SEC
PRE FEV1 FVC: 59.16 %
PRE FEV1: 4.05 L
PRE FVC: 6.85 L
PRE PEF: 8.62 L/S
PROT SERPL-MCNC: 8.1 GM/DL (ref 6–8.4)
PROT UR QL STRIP: NEGATIVE
PSA SERPL-MCNC: 0.41 NG/ML
RBC # BLD AUTO: 4.6 M/UL (ref 4.6–6.2)
SODIUM SERPL-SCNC: 138 MMOL/L (ref 136–145)
SP GR UR STRIP: 1.02
TESTOST SERPL-MCNC: 1027 NG/DL (ref 304–1227)
TRIGL SERPL-MCNC: 70 MG/DL (ref 30–150)
TSH SERPL-ACNC: 1.56 UIU/ML (ref 0.4–4)
WBC # BLD AUTO: 4.65 K/UL (ref 3.9–12.7)

## 2025-05-01 PROCEDURE — 84443 ASSAY THYROID STIM HORMONE: CPT

## 2025-05-01 PROCEDURE — 81479 UNLISTED MOLECULAR PATHOLOGY: CPT | Mod: WS

## 2025-05-01 PROCEDURE — 3074F SYST BP LT 130 MM HG: CPT | Mod: CPTII,S$GLB,, | Performed by: FAMILY MEDICINE

## 2025-05-01 PROCEDURE — 93005 ELECTROCARDIOGRAM TRACING: CPT

## 2025-05-01 PROCEDURE — 3008F BODY MASS INDEX DOCD: CPT | Mod: CPTII,S$GLB,, | Performed by: FAMILY MEDICINE

## 2025-05-01 PROCEDURE — 84153 ASSAY OF PSA TOTAL: CPT

## 2025-05-01 PROCEDURE — 99999 PR PBB SHADOW E&M-EST. PATIENT-LVL III: CPT | Mod: PBBFAC,,, | Performed by: FAMILY MEDICINE

## 2025-05-01 PROCEDURE — 3044F HG A1C LEVEL LT 7.0%: CPT | Mod: CPTII,S$GLB,, | Performed by: FAMILY MEDICINE

## 2025-05-01 PROCEDURE — 83655 ASSAY OF LEAD: CPT

## 2025-05-01 PROCEDURE — 3078F DIAST BP <80 MM HG: CPT | Mod: CPTII,S$GLB,, | Performed by: FAMILY MEDICINE

## 2025-05-01 PROCEDURE — 80053 COMPREHEN METABOLIC PANEL: CPT

## 2025-05-01 PROCEDURE — 93010 ELECTROCARDIOGRAM REPORT: CPT | Mod: ,,, | Performed by: INTERNAL MEDICINE

## 2025-05-01 PROCEDURE — 84403 ASSAY OF TOTAL TESTOSTERONE: CPT

## 2025-05-01 PROCEDURE — 1160F RVW MEDS BY RX/DR IN RCRD: CPT | Mod: CPTII,S$GLB,, | Performed by: FAMILY MEDICINE

## 2025-05-01 PROCEDURE — 99395 PREV VISIT EST AGE 18-39: CPT | Mod: S$GLB,,, | Performed by: FAMILY MEDICINE

## 2025-05-01 PROCEDURE — 82306 VITAMIN D 25 HYDROXY: CPT

## 2025-05-01 PROCEDURE — 83036 HEMOGLOBIN GLYCOSYLATED A1C: CPT

## 2025-05-01 PROCEDURE — 85027 COMPLETE CBC AUTOMATED: CPT

## 2025-05-01 PROCEDURE — 1159F MED LIST DOCD IN RCRD: CPT | Mod: CPTII,S$GLB,, | Performed by: FAMILY MEDICINE

## 2025-05-01 NOTE — PROGRESS NOTES
"Nutrition Assessment  Session Time:  30 minutes      Client name:  Lisa Kat  :  2000  Age:  24 y.o.  Gender:  male    Client states:  [employer] employee.  Present for nutrition counseling as part of his annual Executive Health physical.  Last seen: 2024    Reports no changes to medical or surgical history since last physical.    No questions or concerns today.     Dietary habits remain the same as last time.     Anthropometrics  Height:  72"     Weight:  216 lbs  BMI:  29.29  % Body Fat:  n/a    Clinical Signs/Symptoms  N/V/D:  none noted  Appetite:  good       Past Medical History:   Diagnosis Date    Bradycardia     Tinnitus, right ear        Past Surgical History:   Procedure Laterality Date    ADENOIDECTOMY      SCAPHOID FRACTURE SURGERY Left     TONSILLECTOMY         Medications    currently has no medications in their medication list.    Vitamins, Minerals, and/or Supplements:  not discussed     Food/Medication Interactions:  Reviewed     Food Allergies or Intolerances:  NKFA noted in chart     Social History    Marital status:    Employment:  [employer]    Social History     Tobacco Use    Smoking status: Never    Smokeless tobacco: Never   Substance Use Topics    Alcohol use: Yes     Alcohol/week: 3.0 standard drinks of alcohol     Types: 3 Drinks containing 0.5 oz of alcohol per week        Lab Reports   Sodium   Date Value Ref Range Status   2024 142 136 - 145 mmol/L Final     Potassium   Date Value Ref Range Status   2024 4.3 3.5 - 5.1 mmol/L Final     Chloride   Date Value Ref Range Status   2024 106 95 - 110 mmol/L Final     CO2   Date Value Ref Range Status   2024 26 23 - 29 mmol/L Final     Glucose   Date Value Ref Range Status   2024 84 70 - 110 mg/dL Final     BUN   Date Value Ref Range Status   2024 18 6 - 20 mg/dL Final     Creatinine   Date Value Ref Range Status   2024 1.5 (H) 0.5 - 1.4 mg/dL Final     Calcium   Date " Value Ref Range Status   07/25/2024 9.6 8.7 - 10.5 mg/dL Final     Total Protein   Date Value Ref Range Status   07/25/2024 7.6 6.0 - 8.4 g/dL Final     Albumin   Date Value Ref Range Status   07/25/2024 4.4 3.5 - 5.2 g/dL Final     Total Bilirubin   Date Value Ref Range Status   07/25/2024 0.7 0.1 - 1.0 mg/dL Final     Comment:     For infants and newborns, interpretation of results should be based  on gestational age, weight and in agreement with clinical  observations.    Premature Infant recommended reference ranges:  Up to 24 hours.............<8.0 mg/dL  Up to 48 hours............<12.0 mg/dL  3-5 days..................<15.0 mg/dL  6-29 days.................<15.0 mg/dL       Alkaline Phosphatase   Date Value Ref Range Status   07/25/2024 46 (L) 55 - 135 U/L Final     AST   Date Value Ref Range Status   07/25/2024 23 10 - 40 U/L Final     ALT   Date Value Ref Range Status   07/25/2024 24 10 - 44 U/L Final     Anion Gap   Date Value Ref Range Status   07/25/2024 10 8 - 16 mmol/L Final      Lab Results   Component Value Date    WBC 5.15 07/25/2024    HGB 14.9 07/25/2024    HCT 43.1 07/25/2024    MCV 95 07/25/2024     07/25/2024        Lab Results   Component Value Date    CHOL 206 (H) 07/25/2024     Lab Results   Component Value Date    HDL 68 07/25/2024     Lab Results   Component Value Date    LDLCALC 124.2 07/25/2024     Lab Results   Component Value Date    TRIG 69 07/25/2024     Lab Results   Component Value Date    CHOLHDL 33.0 07/25/2024     Lab Results   Component Value Date    HGBA1C 4.9 07/25/2024     BP Readings from Last 1 Encounters:   07/25/24 (!) 107/56       Food History  Reviewed.    Frequent fast food and fried food intake.   Attempts daily fruits/vegetables.     Alcohol: 1-2x/month  Drinks: Only Water.     Exercise History:  5x/week    Cultural/Spiritual/Personal Preferences:  None identified    Support System:  family/friends    State of Change:  Precontemplation    Barriers to Change:   none    Diagnosis    No nutrition diagnosis at this time    Intervention    RMR (Method:  Edgar St. Jeor):  2001 kcal  Activity Factor:  1.3    HOLLY:  2601 kcal    Goals:  1.  Continue current routine      Nutrition Education  The following education was provided to the patient:  Discussed Heart Healthy Eating.  *Lab results were pending at time of consult and so, not discussed with patient.  Discussed using nutrition as a way to prevent diseases from occurring. Importance of balanced eating even if health is currently stable.     Patient verbalized understanding of nutrition education and recommendations received.    Handouts Provided  none    Monitoring/Evaluation    Monitor the following:  Weight  BMI  Caloric intake  Labs:  lipid panel, glucose, HgbA1c    Follow Up Plan:  Communication with referring healthcare provider is unnecessary at this time as patient presented as part of annual wellness exam.  However, will follow up with patient in 1-2 years.

## 2025-05-01 NOTE — PROGRESS NOTES
Subjective:   Patient ID: Lisa Kat is a 24 y.o. male.  Chief Complaint:  Executive Health    Presents for executive Health evaluation  Past Medical history for bradycardia  Past Surgical history for scaphoid fracture left wrist, tonsillectomy, adenoidectomy   No current medications  No drug allergies   Social history employed by [employer].  .  One child.  One on the way.  Vapes infrequently.  Averages 3 alcoholic beverages per week.  No illicit drug use.    Family history includes mother and father both alive and healthy.  No colon cancer or prostate cancer.    Health maintenance with tetanus booster September 2019, no recent flu vaccine, no previous COVID vaccines, and no previous HPV vaccines.    Request additional testosterone level and vitamin-D level check today   No specific complaints or concerns today        Objective:   /67   Pulse (!) 49   Temp 97.8 °F (36.6 °C)   Ht 6' (1.829 m)   Wt 98 kg (216 lb)   BMI 29.29 kg/m²     Physical Exam  Vitals and nursing note reviewed.   Constitutional:       Appearance: Normal appearance. He is well-developed and normal weight.   HENT:      Right Ear: Tympanic membrane and ear canal normal.      Left Ear: Tympanic membrane and ear canal normal.      Nose: No congestion or rhinorrhea.      Mouth/Throat:      Pharynx: Oropharynx is clear. Uvula midline. No pharyngeal swelling, oropharyngeal exudate or posterior oropharyngeal erythema.   Neck:      Thyroid: No thyroid mass, thyromegaly or thyroid tenderness.   Cardiovascular:      Rate and Rhythm: Regular rhythm. Bradycardia present.      Heart sounds: Normal heart sounds.   Pulmonary:      Effort: Pulmonary effort is normal.      Breath sounds: Normal breath sounds.   Abdominal:      General: There is no distension.      Palpations: Abdomen is soft.      Tenderness: There is no abdominal tenderness.   Skin:     Findings: No rash.   Psychiatric:         Mood and Affect: Mood and  affect normal.     CBC and CMP normal   Urinalysis normal   Lipid panel with total cholesterol 248.  Triglycerides 70.  HDL 77.  .  A1c, TSH, PSA, lead level pending     Pulmonary function testing normal     EKG with marked sinus bradycardia with premature supraventricular complexes and ventricular pre-excitation with Sharlene-Parkinson-White pattern type B. WPW changes are new when compared to previous tracings.    Assessment:       ICD-10-CM ICD-9-CM   1. Routine general medical examination at a health care facility  Z00.00 V70.0   2. Sinus bradycardia  R00.1 427.89   3. Sharlene-Parkinson-White (WPW) pattern seen on electrocardiography  I45.6 426.7     Plan:   Routine general medical examination at a health care facility  -     Vitamin D; Future; Expected date: 05/01/2025  -     Testosterone,Total; Future; Expected date: 05/01/2025  Blood pressure normal.  BMI 29.  Overall exam unremarkable.    Send full executive Health letter when all test resulted   Colon cancer screening at 45 years old   Tetanus booster up-to-date  Declines COVID booster   Declines flu vaccine  Declines HPV vaccines     Sinus bradycardia  Sharlene-Parkinson-White (WPW) pattern seen on electrocardiography  -     Ambulatory referral/consult to Cardiology; Future; Expected date: 05/08/2025  Asymptomatic   New changes on EKG   Referral to Cardiology ordered     Return to clinic 1 year for executive Health evaluation or sooner as needed

## 2025-05-01 NOTE — LETTER
May 1, 2025    Lisa Kat  [address] [place] [address]             The [address]  [address] [facility] [address]  Phone: [phone]  Fax: [phone] Dear Mr. Kat,    On May 1st, 2025, it was a pleasure to see you again and perform your Executive Health evaluation. At the time of this visit, you are 24 years old. You requested additional testing to include a vitamin-D level and a testosterone level today. You denied any specific complaints or concerns during todays visit.      YOUR PAST MEDICAL HISTORY includes Bradycardia.      YOUR PAST SURGICAL HISTORY includes a left scaphoid fracture repair, a tonsillectomy, and adenoidectomy.    You do not take ANY REGULAR MEDICATIONS.    You do not have ANY KNOWN DRUG ALLERGIES.    YOUR SOCIAL HISTORY includes employment by the [employer]. You are  with one child and another on the way.. You vape infrequently. You drink approximately three alcoholic beverages per week.  A You denied any illicit drug use.    YOUR FAMILY HISTORY includes your father, alive, with no known medical problems. Your mother, alive, with no known medical problems. No known colon or prostate cancer.    YOUR ROUTINE HEALTH MAINTENANCE includes a tetanus booster in September 2019, no recent flu vaccines, no previous HPV vaccines, and no previous COVID vaccines.    PHYSICAL EXAMINATION: You are  6 feet tall, weighing 216 pounds with a body mass index of 29. You remain in the Overweight category. Your blood pressure is 105/67. Your heart rate is 49 beats per minute. All of these are acceptable values. Your physical examination did not reveal any significant abnormal findings.    YOUR BLOOD WORK AND ADDITIONAL TESTS: Reveal normal white cell counts, red cell counts, and platelet levels. You are not Anemic. Your glucose, kidney, liver, and electrolyte tests are normal. Your total cholesterol is 248. Your triglycerides  are 70. Your HDL is 77. Your LDL is 157. Based on these numbers, your 10-year risk of heart attack or stroke is remains less than 5 %. Your hemoglobin A1c is 4.9%, which is in a normal range. You do not have Prediabetes or Diabetes. Your TSH level is 1.557, which is in a normal range. Your urinalysis is normal. Your serum PSA level is 0.41, which is in a normal range. You do not have any suspicions for prostate cancer. Your testosterone level is 1,027, which is in the upper limits of normal range. Your vitamin-D level is normal. Your serum lead level is normal.      Your Pulmonary function testing shows a stable mild obstructive pattern.    Your EKG shows a marked sinus bradycardia with premature supraventricular complexes and a ventricular pre-excitation pattern consistent with Sharlene-Parkinson-White type B. The Sharlene-Parkinson-White type B changes are new on this years EKG.    In summary, you are overall in excellent health. Your 10-year risk of heart attack or stroke is low. Daily aspirin or cholesterol medications are not recommended.  Based on your new EKG changes, additional evaluation by Cardiology as recommended.  Referral to an [facility] cardiologist was ordered for you today.      Based on the [place] Preventive Task Force recommendations, you should receive yearly Influenza vaccinations. You should have a Tetanus booster every 10 years. You should complete the primary COVID vaccine series. You should start screening for Colon cancer at 45 years old.    It was a pleasure to see you again and perform this Executive Health evaluation. If I can be of any further assistance, or if you have any additional questions or concerns, please do not hesitate to let me know.    Sincerely,      Anthony Ricardo MD, FAAFP

## 2025-05-22 ENCOUNTER — RESULTS FOLLOW-UP (OUTPATIENT)
Dept: INTERNAL MEDICINE | Facility: CLINIC | Age: 25
End: 2025-05-22